# Patient Record
Sex: FEMALE | Employment: UNEMPLOYED | URBAN - METROPOLITAN AREA
[De-identification: names, ages, dates, MRNs, and addresses within clinical notes are randomized per-mention and may not be internally consistent; named-entity substitution may affect disease eponyms.]

---

## 2023-10-25 ENCOUNTER — OFFICE VISIT (OUTPATIENT)
Dept: ORTHOPEDIC SURGERY | Age: 53
End: 2023-10-25
Payer: COMMERCIAL

## 2023-10-25 ENCOUNTER — TELEPHONE (OUTPATIENT)
Dept: ORTHOPEDIC SURGERY | Age: 53
End: 2023-10-25

## 2023-10-25 VITALS — HEIGHT: 66 IN | BODY MASS INDEX: 22.5 KG/M2 | WEIGHT: 140 LBS

## 2023-10-25 DIAGNOSIS — S82.892A CLOSED FRACTURE DISLOCATION OF LEFT ANKLE, INITIAL ENCOUNTER: Primary | ICD-10-CM

## 2023-10-25 DIAGNOSIS — S93.432A ANKLE SYNDESMOSIS DISRUPTION, LEFT, INITIAL ENCOUNTER: ICD-10-CM

## 2023-10-25 PROCEDURE — 99204 OFFICE O/P NEW MOD 45 MIN: CPT | Performed by: ORTHOPAEDIC SURGERY

## 2023-10-25 RX ORDER — HYDROCODONE BITARTRATE AND ACETAMINOPHEN 5; 325 MG/1; MG/1
1 TABLET ORAL EVERY 6 HOURS PRN
Qty: 20 TABLET | Refills: 0 | Status: SHIPPED | OUTPATIENT
Start: 2023-10-25 | End: 2023-10-30

## 2023-10-25 RX ORDER — SPIRONOLACTONE 25 MG/1
25 TABLET ORAL DAILY
COMMUNITY

## 2023-10-25 RX ORDER — CEPHALEXIN 500 MG/1
500 CAPSULE ORAL 4 TIMES DAILY
Qty: 20 CAPSULE | Refills: 0 | Status: SHIPPED | OUTPATIENT
Start: 2023-10-25 | End: 2023-10-30

## 2023-10-25 RX ORDER — FUROSEMIDE 40 MG/1
40 TABLET ORAL DAILY
COMMUNITY

## 2023-10-25 RX ORDER — PAROXETINE HYDROCHLORIDE 20 MG/1
30 TABLET, FILM COATED ORAL EVERY MORNING
COMMUNITY

## 2023-10-25 RX ORDER — METOPROLOL TARTRATE 50 MG/1
50 TABLET, FILM COATED ORAL DAILY
COMMUNITY

## 2023-10-25 RX ORDER — MINOCYCLINE HYDROCHLORIDE 50 MG/1
50 CAPSULE ORAL 2 TIMES DAILY
COMMUNITY

## 2023-10-25 RX ORDER — PREGABALIN 75 MG/1
75 CAPSULE ORAL 2 TIMES DAILY
COMMUNITY

## 2023-10-25 NOTE — PROGRESS NOTES
Name_______________________________________Printed:____________________  Date and time of surgery_______10/26/23 0845_________________[place] Time:____[address] [place]   1. The instructions given regarding when and if a patient needs to stop oral intake prior to surgery varies. Follow the specific instructions you were given                  _x__Nothing to eat or to drink after Midnight the night before.                   ____Carbo loading or instructions will be given to select patients-if you have been given those instructions -please do the following                           The evening before your surgery after dinner before midnight drink 40 ounces of gatorade. If you are diabetic use sugar free. The morning of surgery drink 40 ounces of water. This needs to be finished 3 hours prior to your surgery start time. 2. Take the following pills with a small sip of water on the morning of surgery_________metoprolol__________________________________________                  Do not take blood pressure medications ending in pril or sartan the kraig prior to surgery or the morning of surgery. Dr Bin Solomon patient are not to take any medications the AM of surgery. 3. Aspirin, Ibuprofen, Advil, Naproxen, Vitamin E and other Anti-inflammatory products and supplements should be stopped for 5 -7days before surgery or as directed by your physician. 4. Check with your Doctor regarding stopping Plavix, Coumadin,Eliquis, Lovenox,Effient,Pradaxa,Xarelto, Fragmin or other blood thinners and follow their instructions. 5. Do not smoke, and do not drink any alcoholic beverages 24 hours prior to surgery. This includes NA Beer. Refrain from the usage of any recreational drugs. 6. You may brush your teeth and gargle the morning of surgery. DO NOT SWALLOW WATER   7. You MUST make arrangements for a responsible adult to stay on site while you are here and take you home after your surgery.  You will not be allowed to leave alone or drive yourself home. It is strongly suggested someone stay with you the first 24 hrs. Your surgery will be cancelled if you do not have a ride home. 8. A parent/legal guardian must accompany a child scheduled for surgery and plan to stay at the hospital until the child is discharged. Please do not bring other children with you. 9. Please wear simple, loose fitting clothing to the hospital.  Vincent Choi not bring valuables (money, credit cards, checkbooks, etc.) Do not wear any makeup (including no eye makeup) or nail polish on your fingers or toes. 10. DO NOT wear any jewelry or piercings on day of surgery. All body piercing jewelry must be removed. 11. If you have ___dentures, they will be removed before going to the OR; we will provide you a container. If you wear ___contact lenses or ___glasses, they will be removed; please bring a case for them. 12. Please see your family doctor/pediatrician for a history & physical and/or concerning medications. Bring any test results/reports from your physician's office. PCP________x__________Phone___________H&P Appt. Date________             13 If you  have a Living Will and Durable Power of  for Healthcare, please bring in a copy. 15. Notify your Surgeon if you develop any illness between now and surgery  time, cough, cold, fever, sore throat, nausea, vomiting, etc.  Please notify your surgeon if you experience dizziness, shortness of breath or blurred vision between now & the time of your surgery             15. DO NOT shave your operative site 96 hours prior to surgery. For face & neck surgery, men may use an electric razor 48 hours prior to surgery. 16. Shower the night before or morning of surgery using an antibacterial soap or as you have been instructed. 17. To provide excellent care visitors will be limited to one in the room at any given time.              18.  Please bring

## 2023-10-25 NOTE — TELEPHONE ENCOUNTER
Auth: NPR  Date: 10/26/23 thru 01/25/24  Reference # [id number]  Spoke with: Online  Type of SX: Outpatient  Location: [facility]  CPT: 93548, 24709   DX: S82.899A, C50.122E  SX area: Lt ankle  Insurance: [facility] [employer]

## 2023-10-26 ENCOUNTER — HOSPITAL ENCOUNTER (OUTPATIENT)
Age: 53
Setting detail: OUTPATIENT SURGERY
Discharge: HOME | End: 2023-10-26
Attending: ORTHOPAEDIC SURGERY | Admitting: ORTHOPAEDIC SURGERY
Payer: COMMERCIAL

## 2023-10-26 ENCOUNTER — ANESTHESIA (OUTPATIENT)
Dept: OPERATING ROOM | Age: 53
End: 2023-10-26
Payer: COMMERCIAL

## 2023-10-26 ENCOUNTER — ANESTHESIA EVENT (OUTPATIENT)
Dept: OPERATING ROOM | Age: 53
End: 2023-10-26
Payer: COMMERCIAL

## 2023-10-26 ENCOUNTER — APPOINTMENT (OUTPATIENT)
Dept: GENERAL RADIOLOGY | Age: 53
End: 2023-10-26
Attending: ORTHOPAEDIC SURGERY
Payer: COMMERCIAL

## 2023-10-26 VITALS
TEMPERATURE: 96.9 F | BODY MASS INDEX: 22.82 KG/M2 | OXYGEN SATURATION: 98 % | HEART RATE: 72 BPM | SYSTOLIC BLOOD PRESSURE: 125 MMHG | WEIGHT: 142 LBS | DIASTOLIC BLOOD PRESSURE: 68 MMHG | RESPIRATION RATE: 16 BRPM | HEIGHT: 66 IN

## 2023-10-26 LAB
ANION GAP SERPL CALCULATED.3IONS-SCNC: 16 MMOL/L (ref 3–16)
BUN SERPL-MCNC: 14 MG/DL (ref 7–20)
CALCIUM SERPL-MCNC: 9.3 MG/DL (ref 8.3–10.6)
CHLORIDE SERPL-SCNC: 98 MMOL/L (ref 99–110)
CO2 SERPL-SCNC: 23 MMOL/L (ref 21–32)
CREAT SERPL-MCNC: 0.7 MG/DL (ref 0.6–1.1)
GFR SERPLBLD CREATININE-BSD FMLA CKD-EPI: >60 ML/MIN/{1.73_M2}
GLUCOSE BLD-MCNC: 102 MG/DL (ref 70–99)
GLUCOSE SERPL-MCNC: 106 MG/DL (ref 70–99)
HCG UR QL: NEGATIVE
PERFORMED ON: ABNORMAL
POTASSIUM SERPL-SCNC: 4.3 MMOL/L (ref 3.5–5.1)
SODIUM SERPL-SCNC: 137 MMOL/L (ref 136–145)

## 2023-10-26 PROCEDURE — 6360000002 HC RX W HCPCS: Performed by: ORTHOPAEDIC SURGERY

## 2023-10-26 PROCEDURE — 27848 TREAT ANKLE DISLOCATION: CPT | Performed by: ORTHOPAEDIC SURGERY

## 2023-10-26 PROCEDURE — 3600000004 HC SURGERY LEVEL 4 BASE: Performed by: ORTHOPAEDIC SURGERY

## 2023-10-26 PROCEDURE — 7100000001 HC PACU RECOVERY - ADDTL 15 MIN: Performed by: ORTHOPAEDIC SURGERY

## 2023-10-26 PROCEDURE — 3700000000 HC ANESTHESIA ATTENDED CARE: Performed by: ORTHOPAEDIC SURGERY

## 2023-10-26 PROCEDURE — 80048 BASIC METABOLIC PNL TOTAL CA: CPT

## 2023-10-26 PROCEDURE — 27829 TREAT LOWER LEG JOINT: CPT | Performed by: ORTHOPAEDIC SURGERY

## 2023-10-26 PROCEDURE — 36415 COLL VENOUS BLD VENIPUNCTURE: CPT

## 2023-10-26 PROCEDURE — 7100000000 HC PACU RECOVERY - FIRST 15 MIN: Performed by: ORTHOPAEDIC SURGERY

## 2023-10-26 PROCEDURE — 2709999900 HC NON-CHARGEABLE SUPPLY: Performed by: ORTHOPAEDIC SURGERY

## 2023-10-26 PROCEDURE — 6360000002 HC RX W HCPCS: Performed by: NURSE ANESTHETIST, CERTIFIED REGISTERED

## 2023-10-26 PROCEDURE — 27829 TREAT LOWER LEG JOINT: CPT | Performed by: NURSE PRACTITIONER

## 2023-10-26 PROCEDURE — C1713 ANCHOR/SCREW BN/BN,TIS/BN: HCPCS | Performed by: ORTHOPAEDIC SURGERY

## 2023-10-26 PROCEDURE — 84703 CHORIONIC GONADOTROPIN ASSAY: CPT

## 2023-10-26 PROCEDURE — 2580000003 HC RX 258: Performed by: ANESTHESIOLOGY

## 2023-10-26 PROCEDURE — 27848 TREAT ANKLE DISLOCATION: CPT | Performed by: NURSE PRACTITIONER

## 2023-10-26 PROCEDURE — 3700000001 HC ADD 15 MINUTES (ANESTHESIA): Performed by: ORTHOPAEDIC SURGERY

## 2023-10-26 PROCEDURE — 2500000003 HC RX 250 WO HCPCS: Performed by: NURSE ANESTHETIST, CERTIFIED REGISTERED

## 2023-10-26 PROCEDURE — 3600000014 HC SURGERY LEVEL 4 ADDTL 15MIN: Performed by: ORTHOPAEDIC SURGERY

## 2023-10-26 PROCEDURE — 6360000002 HC RX W HCPCS: Performed by: ANESTHESIOLOGY

## 2023-10-26 PROCEDURE — 7100000011 HC PHASE II RECOVERY - ADDTL 15 MIN: Performed by: ORTHOPAEDIC SURGERY

## 2023-10-26 PROCEDURE — 2720000010 HC SURG SUPPLY STERILE: Performed by: ORTHOPAEDIC SURGERY

## 2023-10-26 PROCEDURE — 7100000010 HC PHASE II RECOVERY - FIRST 15 MIN: Performed by: ORTHOPAEDIC SURGERY

## 2023-10-26 PROCEDURE — 73600 X-RAY EXAM OF ANKLE: CPT

## 2023-10-26 PROCEDURE — 2580000003 HC RX 258: Performed by: ORTHOPAEDIC SURGERY

## 2023-10-26 PROCEDURE — A4217 STERILE WATER/SALINE, 500 ML: HCPCS | Performed by: ORTHOPAEDIC SURGERY

## 2023-10-26 PROCEDURE — 6370000000 HC RX 637 (ALT 250 FOR IP): Performed by: ANESTHESIOLOGY

## 2023-10-26 DEVICE — SCREW BNE L16MM DIA2.7MM HEX HD DIA2.5MM CANC BIODUR 108C: Type: IMPLANTABLE DEVICE | Site: ANKLE | Status: FUNCTIONAL

## 2023-10-26 DEVICE — SCREW BNE L18MM DIA2.7MM HEX HD DIA2.5MM CANC BIODUR 108C: Type: IMPLANTABLE DEVICE | Site: ANKLE | Status: FUNCTIONAL

## 2023-10-26 DEVICE — SCREW BNE L20MM DIA2.7MM SM HEX HD DIA2.5MM CORT S STL ST: Type: IMPLANTABLE DEVICE | Site: ANKLE | Status: FUNCTIONAL

## 2023-10-26 DEVICE — SCREW BNE L14MM DIA2.7MM HEX HD DIA2.5MM CANC BIODUR 108C: Type: IMPLANTABLE DEVICE | Site: ANKLE | Status: FUNCTIONAL

## 2023-10-26 DEVICE — SCREW BNE L14MM DIA3.5MM HD DIA2.7MM CORT PERIARTC S STL ST: Type: IMPLANTABLE DEVICE | Site: ANKLE | Status: FUNCTIONAL

## 2023-10-26 DEVICE — SCREW BNE L18MM DIA2.7MM CORT ANK FT S STL ST CANN: Type: IMPLANTABLE DEVICE | Site: ANKLE | Status: FUNCTIONAL

## 2023-10-26 DEVICE — PLATE BNE L80MM 4 H L LAT DST PERIARTC FIBULAR S STL LOK: Type: IMPLANTABLE DEVICE | Site: ANKLE | Status: FUNCTIONAL

## 2023-10-26 DEVICE — SCREW BNE L44MM DIA3.5MM CORT S STL ST NONCANNULATED IM: Type: IMPLANTABLE DEVICE | Site: ANKLE | Status: FUNCTIONAL

## 2023-10-26 DEVICE — SCREW BNE L48MM DIA3.5MM CORT PERIARTC S STL ST: Type: IMPLANTABLE DEVICE | Site: ANKLE | Status: FUNCTIONAL

## 2023-10-26 RX ORDER — FENTANYL CITRATE 50 UG/ML
INJECTION, SOLUTION INTRAMUSCULAR; INTRAVENOUS PRN
Status: DISCONTINUED | OUTPATIENT
Start: 2023-10-26 | End: 2023-10-26 | Stop reason: SDUPTHER

## 2023-10-26 RX ORDER — ONDANSETRON 2 MG/ML
4 INJECTION INTRAMUSCULAR; INTRAVENOUS
Status: DISCONTINUED | OUTPATIENT
Start: 2023-10-26 | End: 2023-10-26 | Stop reason: HOSPADM

## 2023-10-26 RX ORDER — HYDROMORPHONE HYDROCHLORIDE 2 MG/ML
0.5 INJECTION, SOLUTION INTRAMUSCULAR; INTRAVENOUS; SUBCUTANEOUS EVERY 5 MIN PRN
Status: DISCONTINUED | OUTPATIENT
Start: 2023-10-26 | End: 2023-10-26 | Stop reason: HOSPADM

## 2023-10-26 RX ORDER — DEXAMETHASONE SODIUM PHOSPHATE 4 MG/ML
INJECTION, SOLUTION INTRA-ARTICULAR; INTRALESIONAL; INTRAMUSCULAR; INTRAVENOUS; SOFT TISSUE PRN
Status: DISCONTINUED | OUTPATIENT
Start: 2023-10-26 | End: 2023-10-26 | Stop reason: SDUPTHER

## 2023-10-26 RX ORDER — PHENYLEPHRINE HCL IN 0.9% NACL 1 MG/10 ML
SYRINGE (ML) INTRAVENOUS PRN
Status: DISCONTINUED | OUTPATIENT
Start: 2023-10-26 | End: 2023-10-26 | Stop reason: SDUPTHER

## 2023-10-26 RX ORDER — BUPIVACAINE HYDROCHLORIDE 5 MG/ML
INJECTION, SOLUTION EPIDURAL; INTRACAUDAL
Status: COMPLETED | OUTPATIENT
Start: 2023-10-26 | End: 2023-10-26

## 2023-10-26 RX ORDER — LIDOCAINE HYDROCHLORIDE 20 MG/ML
INJECTION, SOLUTION INFILTRATION; PERINEURAL PRN
Status: DISCONTINUED | OUTPATIENT
Start: 2023-10-26 | End: 2023-10-26 | Stop reason: SDUPTHER

## 2023-10-26 RX ORDER — MIDAZOLAM HYDROCHLORIDE 1 MG/ML
INJECTION INTRAMUSCULAR; INTRAVENOUS PRN
Status: DISCONTINUED | OUTPATIENT
Start: 2023-10-26 | End: 2023-10-26 | Stop reason: SDUPTHER

## 2023-10-26 RX ORDER — SODIUM CHLORIDE 9 MG/ML
INJECTION, SOLUTION INTRAVENOUS PRN
Status: DISCONTINUED | OUTPATIENT
Start: 2023-10-26 | End: 2023-10-26 | Stop reason: HOSPADM

## 2023-10-26 RX ORDER — SODIUM CHLORIDE 0.9 % (FLUSH) 0.9 %
5-40 SYRINGE (ML) INJECTION EVERY 12 HOURS SCHEDULED
Status: DISCONTINUED | OUTPATIENT
Start: 2023-10-26 | End: 2023-10-26 | Stop reason: HOSPADM

## 2023-10-26 RX ORDER — PROPOFOL 10 MG/ML
INJECTION, EMULSION INTRAVENOUS PRN
Status: DISCONTINUED | OUTPATIENT
Start: 2023-10-26 | End: 2023-10-26 | Stop reason: SDUPTHER

## 2023-10-26 RX ORDER — MEPERIDINE HYDROCHLORIDE 25 MG/ML
12.5 INJECTION INTRAMUSCULAR; INTRAVENOUS; SUBCUTANEOUS EVERY 5 MIN PRN
Status: DISCONTINUED | OUTPATIENT
Start: 2023-10-26 | End: 2023-10-26 | Stop reason: HOSPADM

## 2023-10-26 RX ORDER — SODIUM CHLORIDE 9 MG/ML
INJECTION, SOLUTION INTRAVENOUS CONTINUOUS
Status: DISCONTINUED | OUTPATIENT
Start: 2023-10-26 | End: 2023-10-26 | Stop reason: HOSPADM

## 2023-10-26 RX ORDER — ONDANSETRON 2 MG/ML
INJECTION INTRAMUSCULAR; INTRAVENOUS PRN
Status: DISCONTINUED | OUTPATIENT
Start: 2023-10-26 | End: 2023-10-26 | Stop reason: SDUPTHER

## 2023-10-26 RX ORDER — SODIUM CHLORIDE 0.9 % (FLUSH) 0.9 %
5-40 SYRINGE (ML) INJECTION PRN
Status: DISCONTINUED | OUTPATIENT
Start: 2023-10-26 | End: 2023-10-26 | Stop reason: HOSPADM

## 2023-10-26 RX ORDER — HYDROCODONE BITARTRATE AND ACETAMINOPHEN 5; 325 MG/1; MG/1
1 TABLET ORAL
Status: COMPLETED | OUTPATIENT
Start: 2023-10-26 | End: 2023-10-26

## 2023-10-26 RX ADMIN — MIDAZOLAM 2 MG: 1 INJECTION INTRAMUSCULAR; INTRAVENOUS at 08:05

## 2023-10-26 RX ADMIN — PROPOFOL 150 MG: 10 INJECTION, EMULSION INTRAVENOUS at 08:10

## 2023-10-26 RX ADMIN — HYDROMORPHONE HYDROCHLORIDE 0.5 MG: 2 INJECTION, SOLUTION INTRAMUSCULAR; INTRAVENOUS; SUBCUTANEOUS at 09:29

## 2023-10-26 RX ADMIN — CEFAZOLIN 2000 MG: 2 INJECTION, POWDER, FOR SOLUTION INTRAMUSCULAR; INTRAVENOUS at 08:04

## 2023-10-26 RX ADMIN — Medication 150 MCG: at 08:55

## 2023-10-26 RX ADMIN — FENTANYL CITRATE 50 MCG: 50 INJECTION, SOLUTION INTRAMUSCULAR; INTRAVENOUS at 08:49

## 2023-10-26 RX ADMIN — DEXAMETHASONE SODIUM PHOSPHATE 4 MG: 4 INJECTION, SOLUTION INTRAMUSCULAR; INTRAVENOUS at 08:18

## 2023-10-26 RX ADMIN — Medication 100 MCG: at 08:17

## 2023-10-26 RX ADMIN — MIDAZOLAM 2 MG: 1 INJECTION INTRAMUSCULAR; INTRAVENOUS at 08:07

## 2023-10-26 RX ADMIN — SODIUM CHLORIDE: 9 INJECTION, SOLUTION INTRAVENOUS at 07:45

## 2023-10-26 RX ADMIN — Medication 100 MCG: at 08:19

## 2023-10-26 RX ADMIN — Medication 100 MCG: at 08:57

## 2023-10-26 RX ADMIN — Medication 150 MCG: at 08:45

## 2023-10-26 RX ADMIN — HYDROCODONE BITARTRATE AND ACETAMINOPHEN 1 TABLET: 5; 325 TABLET ORAL at 09:52

## 2023-10-26 RX ADMIN — Medication 100 MCG: at 08:30

## 2023-10-26 RX ADMIN — FENTANYL CITRATE 50 MCG: 50 INJECTION, SOLUTION INTRAMUSCULAR; INTRAVENOUS at 08:22

## 2023-10-26 RX ADMIN — LIDOCAINE HYDROCHLORIDE 100 MG: 20 INJECTION, SOLUTION INFILTRATION; PERINEURAL at 08:10

## 2023-10-26 RX ADMIN — ONDANSETRON 4 MG: 2 INJECTION INTRAMUSCULAR; INTRAVENOUS at 08:18

## 2023-10-26 RX ADMIN — Medication 100 MCG: at 08:23

## 2023-10-26 RX ADMIN — HYDROMORPHONE HYDROCHLORIDE 0.5 MG: 2 INJECTION, SOLUTION INTRAMUSCULAR; INTRAVENOUS; SUBCUTANEOUS at 09:35

## 2023-10-26 RX ADMIN — Medication 100 MCG: at 08:36

## 2023-10-26 ASSESSMENT — PAIN DESCRIPTION - LOCATION
LOCATION: ANKLE

## 2023-10-26 ASSESSMENT — PAIN SCALES - GENERAL
PAINLEVEL_OUTOF10: 8
PAINLEVEL_OUTOF10: 4
PAINLEVEL_OUTOF10: 8

## 2023-10-26 ASSESSMENT — PAIN DESCRIPTION - ORIENTATION
ORIENTATION: LEFT

## 2023-10-26 ASSESSMENT — PAIN DESCRIPTION - DESCRIPTORS
DESCRIPTORS: SORE
DESCRIPTORS: SORE
DESCRIPTORS: ACHING
DESCRIPTORS: DULL

## 2023-10-26 ASSESSMENT — PAIN - FUNCTIONAL ASSESSMENT
PAIN_FUNCTIONAL_ASSESSMENT: PREVENTS OR INTERFERES SOME ACTIVE ACTIVITIES AND ADLS
PAIN_FUNCTIONAL_ASSESSMENT: 0-10

## 2023-10-26 NOTE — PROGRESS NOTES
Teaching/ education completed for home care including pain management, wound care,activity,safety precautions and infection control. Patient and family verbalized understanding.

## 2023-10-26 NOTE — DISCHARGE INSTRUCTIONS
Post op instruction:  1- D/C home  2- Dx Left ankle pain / ankle fracture dislocation with syndesmosis disruption. 3- NWB left ankle  4- Elevation surgical site, with ice  5- Keep splint dry and clean  6- F/U in 2 weeks. 7- For DVT prophylaxis- Aspirin 325 mg daily (Do not take since taking Eliquis)    Qamar Allison MD, 10/26/2023        ORTHOPEDIC/PODIATRY DISCHARGE INSTRUCTIONS    Follow your surgeons instructions. Make follow-up appointment. Robin Redd op 9:30 AM   Dr. Qamar Allison MD  [address] [facility]   [address]   [phone]     Observe operative area for signs of excessive bleeding such as a slow general ooze that saturates the dressing or bright red bleeding. In either case, apply pressure to the area and elevate if possible and call your surgeon right away. Observe the affected extremity for circulation or nerve impairment such as a change in color, numbness, tingling, coldness or increased pain. If any of these symptoms are present call your surgeon. Observe operative site for any signs of infection such as increased pain, redness, fever greater than 101 degrees, swelling, foul odor or drainage. Contact surgeon if any of these symptoms are present. If you become short of breath call your surgeon or go to the nearest emergency room. Elevate extremity as directed by surgeon. Use ice pack as directed by surgeon. Do not use heat. Avoid stress to suture line such as pulling, pushing or tugging. Use crutches as directed by surgeon NON WEIGHT BEARING LEFT FOOT/ANKLE  Take medications as ordered. Take pain medication with food. Do not drive or operate machinery while taking narcotics. Call your surgeon for any questions or problems. DR Josephine Fabian [phone]    ANESTHESIA DISCHARGE INSTRUCTIONS    Wear your seatbelt home.   You are under the influence of drugs-do not drink alcohol, drive, operate machinery, make any

## 2023-10-26 NOTE — ANESTHESIA PRE PROCEDURE
Department of Anesthesiology  Preprocedure Note       Name:  Soila Gonzalez   Age:  46 y.o.  :  1970                                          MRN:  [id number]         Date:  10/26/2023      Surgeon: Yu Lopez):  Kennedi Elizabeth MD    Procedure: Procedure(s):  LEFT ANKLE OPEN REDUCTION INTERNAL FIXATION FRACTURE DISLOCATION WITH SYNDESMOSIS REPAIR    Medications prior to admission:   Prior to Admission medications    Medication Sig Start Date End Date Taking? Authorizing Provider   Apixaban (ELIQUIS PO) Take 5 mg by mouth in the morning and 5 mg in the evening. Yes Albert Samson MD   Sacubitril-Valsartan (ENTRESTO PO) 26 mg 2 times daily   Yes Albert Samson MD   spironolactone (ALDACTONE) 25 MG tablet Take 1 tablet by mouth daily   Yes Albert Samson MD   pregabalin (LYRICA) 75 MG capsule Take 1 capsule by mouth 2 times daily. Max Daily Amount: 150 mg   Yes Albert Samson MD   furosemide (LASIX) 40 MG tablet Take 1 tablet by mouth daily   Yes Albert Samson MD   metoprolol tartrate (LOPRESSOR) 50 MG tablet Take 1 tablet by mouth daily   Yes ProviderAlbert MD   dapagliflozin (FARXIGA) 10 MG tablet Take 1 tablet by mouth every morning   Yes ProviderAlbert MD   PARoxetine (PAXIL) 20 MG tablet Take 1.5 tablets by mouth every morning   Yes ProviderAlbert MD   minocycline (MINOCIN;DYNACIN) 50 MG capsule Take 1 capsule by mouth 2 times daily   Yes Albert Samson MD   HYDROcodone-acetaminophen (NORCO) 5-325 MG per tablet Take 1 tablet by mouth every 6 hours as needed for Pain for up to 5 days. Max Daily Amount: 4 tablets 10/25/23 10/30/23  Kennedi Elizabeth MD   cephALEXin (KEFLEX) 500 MG capsule Take 1 capsule by mouth 4 times daily for 5 days 10/25/23 10/30/23  Kennedi Elizabeth MD       Current medications:    No current facility-administered medications for this encounter.        Allergies:  No Known Allergies    Problem List:    Patient Active

## 2023-10-26 NOTE — H&P
Preoperative H&P Update    The patient's History and Physical in the medical record from 10/25/2023 was reviewed by me today. Past Medical History:   Diagnosis Date    Cardiomyopathy ([address])     CHF (congestive heart failure) ([address])     Hx of blood clots      Past Surgical History:   Procedure Laterality Date    BACK SURGERY       No current facility-administered medications on file prior to encounter. Current Outpatient Medications on File Prior to Encounter   Medication Sig Dispense Refill    Apixaban (ELIQUIS PO) Take 5 mg by mouth in the morning and 5 mg in the evening. Sacubitril-Valsartan (ENTRESTO PO) 26 mg 2 times daily      spironolactone (ALDACTONE) 25 MG tablet Take 1 tablet by mouth daily      pregabalin (LYRICA) 75 MG capsule Take 1 capsule by mouth 2 times daily. Max Daily Amount: 150 mg      furosemide (LASIX) 40 MG tablet Take 1 tablet by mouth daily      metoprolol tartrate (LOPRESSOR) 50 MG tablet Take 1 tablet by mouth daily      dapagliflozin (FARXIGA) 10 MG tablet Take 1 tablet by mouth every morning      PARoxetine (PAXIL) 20 MG tablet Take 1.5 tablets by mouth every morning      minocycline (MINOCIN;DYNACIN) 50 MG capsule Take 1 capsule by mouth 2 times daily      HYDROcodone-acetaminophen (NORCO) 5-325 MG per tablet Take 1 tablet by mouth every 6 hours as needed for Pain for up to 5 days. Max Daily Amount: 4 tablets 20 tablet 0    cephALEXin (KEFLEX) 500 MG capsule Take 1 capsule by mouth 4 times daily for 5 days 20 capsule 0       No Known Allergies   I reviewed the HPI, medications, allergies, reason for surgery, diagnosis and treatment plan and there has been no change. The patient was evaluated by me today. Physical exam findings for this update include: There were no vitals filed for this visit.   Airway is intact  Chest: chest clear, no wheezing, rales, normal symmetric air entry, no tachypnea, retractions or cyanosis  Heart: regular rate and rhythm ; heart sounds

## 2023-10-26 NOTE — PROGRESS NOTES
Discharge instructions reviewed with patient/responsible adult. All home medications have been reviewed, questions answered and patient verbalized understanding. Discharge instructions signed and copies given. Patient discharged  per w/cwmihai belongings.

## 2023-10-26 NOTE — PROGRESS NOTES
Received from OR -Drowsy,simple mask @ 6 liters 100%,left ankle dressing/ace/splint dry and intact,circulation good, elevated ,ice pack placed, scd,vss.

## 2023-10-26 NOTE — ANESTHESIA POSTPROCEDURE EVALUATION
Department of Anesthesiology  Postprocedure Note    Patient: Sandie Hernandez  MRN: [id number]  YOB: 1970  Date of evaluation: 10/26/2023      Procedure Summary     Date: 10/26/23 Room / Location: [address]    Anesthesia Start: 9526 Anesthesia Stop: 2970    Procedure: LEFT ANKLE OPEN REDUCTION INTERNAL FIXATION FRACTURE DISLOCATION WITH SYNDESMOSIS REPAIR (Left: Ankle) Diagnosis:       Closed fracture of ankle, unspecified laterality, initial encounter      Syndesmotic disruption of ankle, left, initial encounter      (Closed fracture of ankle, unspecified laterality, initial encounter [S82.899A])      (Syndesmotic disruption of ankle, left, initial encounter [P63.178L])    Surgeons: Guillermo Jacques MD Responsible Provider: Celina Tilley MD    Anesthesia Type: General ASA Status: 3          Anesthesia Type: General    Db Phase I: Db Score: 8    Db Phase II:        Anesthesia Post Evaluation    Patient location during evaluation: PACU  Patient participation: complete - patient participated  Level of consciousness: awake  Airway patency: patent  Nausea & Vomiting: no vomiting and no nausea  Complications: no  Cardiovascular status: hemodynamically stable  Respiratory status: acceptable  Hydration status: stable  Multimodal analgesia pain management approach  Pain management: adequate

## 2023-10-27 NOTE — BRIEF OP NOTE
Brief Postoperative Note      Patient: Tripp Schaffer  YOB: 1970  MRN: [id number]    Date of Procedure: 10/26/2023    Pre-Op Diagnosis Codes:     * Closed fracture of ankle, unspecified laterality, initial encounter [S82.899A]     * Syndesmotic disruption of ankle, left, initial encounter [S93.432A]    Post-Op Diagnosis: Same       Procedure(s):  LEFT ANKLE OPEN REDUCTION INTERNAL FIXATION FRACTURE DISLOCATION WITH SYNDESMOSIS REPAIR    Surgeon(s):  Carlos Rosales MD    Assistant: Drea Retana CNP    Anesthesia: General    Estimated Blood Loss (mL): Minimal    Complications: None    Specimens:   * No specimens in log *    Implants:  Implant Name Type Inv. Item Serial No.  Lot No. LRB No. Used Action   PLATE BNE [id number] 4 H L LAT DST PERIARTC FIBULAR S STL [place]ORE - [id number]  PLATE BNE [id number] 4 H L LAT DST PERIARTC FIBULAR S STL THEODORE  [employer]WD  Left 1 Implanted   SCREW BNE L14MM DIA3. 5MM HD DIA2.7MM INDIGO PERIARTC S STL ST - [id number]  SCREW BNE L14MM DIA3. 5MM HD DIA2.7MM INDIGO PERIARTC S STL ST  [employer]WD  Left 2 Implanted   SCREW BNE L44MM DIA3.5MM INDIGO S STL ST NONCANNULATED IM - [id number]  SCREW BNE L44MM DIA3.5MM INDIGO S STL ST NONCANNULATED IM  [employer]WD  Left 1 Implanted   SCREW BNE L48MM DIA3.5MM INDIGO PERIARTC S STL ST - [id number]  SCREW BNE L48MM DIA3.5MM INDIGO PERIARTC S STL ST  [employer]IN[employer]WD  Left 1 Implanted   SCREW BNE L18MM DIA2.7MM INDIGO ANK FT S STL ST ROCÍO - [id number]  SCREW BNE L18MM DIA2.7MM INDIGO ANK FT S STL ST ROCÍO  [employer]IN[employer]WD  Left 1 Implanted   SCREW BNE L20MM DIA2.7MM SM HEX HD DIA2.5MM INDIGO S STL ST - [id number]  SCREW BNE L20MM DIA2.7MM SM HEX HD DIA2.5MM INDIGO S STL ST  [employer]-WD  Left 1 Implanted   SCREW BNE L18MM DIA2.7MM HEX HD DIA2.5MM CANC BIODUR 108C - [id number]  SCREW BNE L18MM DIA2.7MM HEX HD DIA2.5MM CANC BIODUR 108C  [employer]INASH [employer]ET [employer]-WD  Left 2 Implanted   SCREW BNE L16MM DIA2.7MM

## 2023-10-27 NOTE — OP NOTE
[address] [facility]                     [address], [address]                                OPERATIVE REPORT    PATIENT NAME: Jenifer Orlando                    :        1970  MED REC NO:   [id number]                          ROOM:  ACCOUNT NO:   [de-identified]                           ADMIT DATE: 10/26/2023  PROVIDER:     Loreto Rebolledo MD    DATE OF PROCEDURE:  10/26/2023    PREOPERATIVE DIAGNOSES:  1. Left ankle comminuted unstable ankle fracture dislocation. 2.  Left ankle distal tibiofibular syndesmosis disruption. POSTOPERATIVE DIAGNOSES:  1. Left ankle comminuted unstable ankle fracture dislocation. 2.  Left ankle distal tibiofibular syndesmosis disruption. OPERATION PERFORMED:  1. Open treatment of left ankle unstable fracture dislocation with open  reduction and internal fixation. 2.  Open treatment of left ankle distal tibiofibular syndesmosis  disruption with two syndesmosis screws. SURGEON:  Loreto Rebolledo MD    ASSISTANT:  Kiara Anne CNP    ANESTHESIA:  General anesthesia. ESTIMATED BLOOD LOSS:  Minimal.    COMPLICATIONS:  None. TOURNIQUET:  Left upper thigh, 350 mmHg. IMPLANT USED:  1. Alida distal fibula locking plate and two lag screws. 2.  Alida 3.5 cortical screw x2 for syndesmosis repair. INDICATIONS:  This is a 67-year-old white female, still fairly active,  who was in a festival out of town and sustained a twisting injury to her  left ankle. She sustained the left ankle fracture dislocation. She was  seen in outside facility where she was x-rayed and reduced and splinted. She was then seen at the [facility] and came to me for second  opinion and we recommended surgical fixation. All risks, benefits, and  alternatives were discussed with the patient and she agreed to proceed  with the surgical repair. DESCRIPTION OF THE PROCEDURE:  The patient's left ankle was marked.   She  received 2 gm Ancef IV was taken to the recovery  in a stable condition. Doug Wolff CNP was 1st Assist given the nature of the procedure that needed advanced assistance. She assisted in all aspect of the procedure, including but limited to draping in a sterile fashion, exposure of surgical area, controlling bleeding, retracting and protecting important structures, achieve and maintain bone reduction and surgical wound closure with dressing application. POSTOPERATIVE PLAN:  The patient will be discharged home. Because of  the instability of the fracture dislocation, she will be  nonweightbearing for six weeks but we can start range of motion in two  weeks.         Serg Zurita MD    D: 10/27/2023 6:52:42       T: 10/27/2023 11:51:08     [email or web address]  Job#: [id number]     Doc#: [id number]    CC:

## 2023-10-30 ENCOUNTER — TELEPHONE (OUTPATIENT)
Dept: ORTHOPEDIC SURGERY | Age: 53
End: 2023-10-30

## 2023-10-30 NOTE — TELEPHONE ENCOUNTER
Received a request from Jeancarlos Castillo to discuss hydrocodone treatment goals.  Patient states she is able to manage her pain with OTC Tylenol

## 2023-11-08 ENCOUNTER — OFFICE VISIT (OUTPATIENT)
Dept: ORTHOPEDIC SURGERY | Age: 53
End: 2023-11-08

## 2023-11-08 VITALS — BODY MASS INDEX: 22.82 KG/M2 | HEIGHT: 66 IN | WEIGHT: 142 LBS

## 2023-11-08 DIAGNOSIS — S93.432A ANKLE SYNDESMOSIS DISRUPTION, LEFT, INITIAL ENCOUNTER: ICD-10-CM

## 2023-11-08 DIAGNOSIS — S82.892A CLOSED FRACTURE DISLOCATION OF LEFT ANKLE, INITIAL ENCOUNTER: Primary | ICD-10-CM

## 2023-11-08 PROCEDURE — APPNB30 APP NON BILLABLE TIME 0-30 MINS: Performed by: NURSE PRACTITIONER

## 2023-11-08 PROCEDURE — 99024 POSTOP FOLLOW-UP VISIT: CPT | Performed by: NURSE PRACTITIONER

## 2023-11-08 NOTE — PROGRESS NOTES
DIAGNOSIS:  Left ankle comminuted unstable ankle fracture dislocation, status post ORIF, with syndesmosis repair x2 screws. DATE OF SURGERY: 10/26/2023. HISTORY OF PRESENT ILLNESS:  Ms. Ha Basurto 46 y.o.  female who came in today for 2 weeks postoperative visit. The patient denies any significant pain in the left ankle. Rates pain a 6/10 VAS moderate, aching, intermittent and are improving. Aggravating factors movement. Alleviating factors elevation and rest. She has been in a splint, and non WB. No numbness or tingling sensation. No fever or Chills. She is on Eliquis for a cardiac condition. PHYSICAL EXAMINATION:  The incision healing well. No signs of any erythema or drainage, minimal swelling. She has no pain with the active or passive range of motion of the left ankle, but decrease ROM. She has intact sensation distally, and she is neurovascularly intact. IMAGING:  Three views left ankle showed anatomic alignment of the fracture, plate and screws and syndesmosis screws x2 in good position, no loosening. Ankle mortise is well centered. IMPRESSION:  2 weeks out from left ankle comminuted unstable ankle fracture dislocation, ORIF with syndesmosis repair x2 screws, and doing very well. PLAN: She placed in a boot, and non WB 6 weeks. I have told the patient to work on ROM, she was instructed  in incisional care. The patient will come back for a follow up in 6 weeks. At that time, we will take 3 views of the left ankle standing. She will need a staged procedure with syndesmosis screw removal 4-5 months postop.             Nguyen Man, APRN - CNP

## 2024-01-31 ENCOUNTER — OFFICE VISIT (OUTPATIENT)
Dept: ORTHOPEDIC SURGERY | Age: 54
End: 2024-01-31
Payer: COMMERCIAL

## 2024-01-31 DIAGNOSIS — S93.432A ANKLE SYNDESMOSIS DISRUPTION, LEFT, INITIAL ENCOUNTER: ICD-10-CM

## 2024-01-31 DIAGNOSIS — S82.892A CLOSED FRACTURE DISLOCATION OF LEFT ANKLE, INITIAL ENCOUNTER: Primary | ICD-10-CM

## 2024-01-31 PROCEDURE — 99213 OFFICE O/P EST LOW 20 MIN: CPT | Performed by: ORTHOPAEDIC SURGERY

## 2024-01-31 NOTE — PROGRESS NOTES
DIAGNOSIS:  Left ankle comminuted unstable ankle fracture dislocation, status post ORIF, with syndesmosis repair x2 screws.    DATE OF SURGERY: 10/26/2023.    HISTORY OF PRESENT ILLNESS:  Dianelys Diaz 53 y.o.  female who came in today for 3 months postoperative visit.  The patient denies any significant pain in the left ankle. Rates pain a 4/10 VAS moderate and doing very well. She has been in regular shoes and WB. She has numbness or tingling sensation in the dorsal foot.  No fever or Chills.  She is on Eliquis for a cardiac condition.    Past Medical History:   Diagnosis Date    Cardiomyopathy (HCC)     CHF (congestive heart failure) (HCC)     Hx of blood clots        Past Surgical History:   Procedure Laterality Date    ANKLE FRACTURE SURGERY Left 10/26/2023    LEFT ANKLE OPEN REDUCTION INTERNAL FIXATION FRACTURE DISLOCATION WITH SYNDESMOSIS REPAIR performed by John Shaw MD at [facility] OR    BACK SURGERY         Social History     Socioeconomic History    Marital status: Single     Spouse name: Not on file    Number of children: Not on file    Years of education: Not on file    Highest education level: Not on file   Occupational History    Not on file   Tobacco Use    Smoking status: Former     Current packs/day: 0.00     Types: Cigarettes     Quit date:      Years since quittin.1    Smokeless tobacco: Never   Substance and Sexual Activity    Alcohol use: Yes     Alcohol/week: 2.0 standard drinks of alcohol     Types: 2 Glasses of wine per week     Comment: social    Drug use: Never    Sexual activity: Not on file   Other Topics Concern    Not on file   Social History Narrative    Not on file     Social Determinants of Health     Financial Resource Strain: Not on file   Food Insecurity: Not on file   Transportation Needs: Not on file   Physical Activity: Not on file   Stress: Not on file   Social Connections: Not on file   Intimate Partner Violence: Not on file   Housing Stability: Not

## 2024-02-28 ENCOUNTER — PREP FOR PROCEDURE (OUTPATIENT)
Dept: ORTHOPEDIC SURGERY | Age: 54
End: 2024-02-28

## 2024-02-28 ENCOUNTER — TELEPHONE (OUTPATIENT)
Dept: ORTHOPEDIC SURGERY | Age: 54
End: 2024-02-28

## 2024-02-28 ENCOUNTER — OFFICE VISIT (OUTPATIENT)
Dept: ORTHOPEDIC SURGERY | Age: 54
End: 2024-02-28
Payer: COMMERCIAL

## 2024-02-28 VITALS — WEIGHT: 141 LBS | HEIGHT: 65 IN | BODY MASS INDEX: 23.49 KG/M2

## 2024-02-28 DIAGNOSIS — S93.432D SYNDESMOTIC DISRUPTION OF LEFT ANKLE, SUBSEQUENT ENCOUNTER: ICD-10-CM

## 2024-02-28 DIAGNOSIS — S82.892A CLOSED FRACTURE DISLOCATION OF LEFT ANKLE, INITIAL ENCOUNTER: Primary | ICD-10-CM

## 2024-02-28 PROBLEM — S93.432A SYNDESMOTIC DISRUPTION OF LEFT ANKLE: Status: ACTIVE | Noted: 2024-02-28

## 2024-02-28 PROCEDURE — 99214 OFFICE O/P EST MOD 30 MIN: CPT | Performed by: ORTHOPAEDIC SURGERY

## 2024-02-28 NOTE — PROGRESS NOTES
[facility] Pre-Admission Testing Electronic Communication Worksheet for OR/ENDO Procedures        Patient: Dianelys Diaz    DOS: 3/4    Arrival Time:0600     Surgery Time:0745    Meds to Bed:  [] YES    [x]  NO    Transportation Confirmed: [x] YES    []  NO    History and Physical:  [x] YES    []  NO  [] N/A  If yes, please list doctor or Urgent Care and date of H&P: per Dr Shaw    Additional Clearance(Cardiac, Pulmonary, etc):  [] YES    []  NO    Pre-Admission Testing Visit:  [] YES    [x]  NO If no, do labs/testing need to be done DOS?  [] YES    []  NO    Medication Reconciliation Complete:  [x] YES    []  NO        Additional Notes: Pt states Dr Shaw \"did not have her stop her Eliquis for surgery like last time \"                Interview Complete: [x] YES    []  NO          Selma Jackson, RN  1:16 PM

## 2024-02-28 NOTE — PROGRESS NOTES
History reviewed. No pertinent family history.    Current Outpatient Medications on File Prior to Visit   Medication Sig Dispense Refill    Apixaban (ELIQUIS PO) Take 5 mg by mouth in the morning and 5 mg in the evening.      Sacubitril-Valsartan (ENTRESTO PO) 26 mg 2 times daily      spironolactone (ALDACTONE) 25 MG tablet Take 1 tablet by mouth daily      pregabalin (LYRICA) 75 MG capsule Take 1 capsule by mouth 2 times daily.      furosemide (LASIX) 40 MG tablet Take 1 tablet by mouth daily      metoprolol tartrate (LOPRESSOR) 50 MG tablet Take 1 tablet by mouth daily      dapagliflozin (FARXIGA) 10 MG tablet Take 1 tablet by mouth every morning      PARoxetine (PAXIL) 20 MG tablet Take 1.5 tablets by mouth every morning      minocycline (MINOCIN;DYNACIN) 50 MG capsule Take 1 capsule by mouth 2 times daily       No current facility-administered medications on file prior to visit.       Pertinent items are noted in HPI  Review of systems reviewed from Patient History Form and available in the patient's chart under the Media tab. No change noted.      PHYSICAL EXAMINATION:  Ms. iDaz is a very pleasant 53 y.o.  female who presents today in no acute distress, awake, alert, and oriented.  She is well dressed, nourished and  groomed.  Patient with normal affect.  Height is  1.651 m (5' 5\"), weight is 64 kg (141 lb), Body mass index is 23.46 kg/m².  Resting respiratory rate is 16.     The patient walks with no limp. The incision healed well.  No signs of any erythema or drainage, minimal swelling. She has no pain with the active or passive range of motion of the left ankle, but mild decrease ROM.  She has intact sensation distally, and she is neurovascularly intact.    IMAGING:  Three views left ankle showed anatomic alignment of the fracture, plate and screws and syndesmosis screws x 2 in good position, no loosening. Ankle mortise is well centered.    IMPRESSION:  3 months out from left ankle

## 2024-02-28 NOTE — PROGRESS NOTES
[facility] PRE-OPERATIVE INSTRUCTIONS    Day of Procedure:    3/4            Arrival time:    0600            Surgery time:0745    Take the following medications with a sip of water:  Follow your MD/Surgeons pre-procedure instructions regarding your medications     Do not eat or drink anything after 12:00 midnight prior to your surgery.  This includes water chewing gum, mints and ice chips.   You may brush your teeth and gargle the morning of your surgery, but do not swallow the water     Please see your family doctor/pediatrician for a history and physical and/or concerning medications.   Bring any test results/reports from your physicians office.   If you are under the care of a heart doctor or specialist doctor, please be aware that you may be asked to them for clearance    You may be asked to stop blood thinners such as Coumadin, Plavix, Fragmin, Lovenox, etc., or any anti-inflammatories such as:  Aspirin, Ibuprofen, Advil, Naproxen prior to your surgery.    We also ask that you stop any OTC medications such as fish oil, vitamin E, glucosamine, garlic, Multivitamins, COQ 10, etc.    We ask that you do not smoke 24 hours prior to surgery  We ask that you do not  drink any alcoholic beverages 24 hours prior to surgery     You must make arrangements for a responsible adult to take you home after your surgery.    For your safety you will not be allowed to leave alone or drive yourself home.  Your surgery will be cancelled if you do not have a ride home.     Also for your safety, it is strongly suggested that someone stay with you the first 24 hours after your surgery.     A parent or legal guardian must accompany a child scheduled for surgery and plan to stay at the hospital until the child is discharged.    Please do not bring other children with you.    For your comfort, please wear simple loose fitting clothing to the hospital.  Please do not bring valuables.    Do not wear any make-up or nail polish

## 2024-02-28 NOTE — TELEPHONE ENCOUNTER
Auth: NPR  Date: 03/04/24 thru 06/03/24  Reference # [id number]  Spoke with: Online  Type of SX: Outpatient  Location: W  CPT: 21359   DX: T84.84XA  SX area: LT ankle  Insurance: Stacy

## 2024-03-01 ENCOUNTER — ANESTHESIA EVENT (OUTPATIENT)
Dept: OPERATING ROOM | Age: 54
End: 2024-03-01
Payer: COMMERCIAL

## 2024-03-04 ENCOUNTER — APPOINTMENT (OUTPATIENT)
Dept: GENERAL RADIOLOGY | Age: 54
End: 2024-03-04
Attending: ORTHOPAEDIC SURGERY
Payer: COMMERCIAL

## 2024-03-04 ENCOUNTER — HOSPITAL ENCOUNTER (OUTPATIENT)
Age: 54
Setting detail: OUTPATIENT SURGERY
Discharge: HOME | End: 2024-03-04
Attending: ORTHOPAEDIC SURGERY | Admitting: ORTHOPAEDIC SURGERY
Payer: COMMERCIAL

## 2024-03-04 ENCOUNTER — ANESTHESIA (OUTPATIENT)
Dept: OPERATING ROOM | Age: 54
End: 2024-03-04
Payer: COMMERCIAL

## 2024-03-04 VITALS
SYSTOLIC BLOOD PRESSURE: 95 MMHG | WEIGHT: 140.87 LBS | OXYGEN SATURATION: 100 % | TEMPERATURE: 97.2 F | HEART RATE: 75 BPM | RESPIRATION RATE: 14 BRPM | BODY MASS INDEX: 23.47 KG/M2 | DIASTOLIC BLOOD PRESSURE: 62 MMHG | HEIGHT: 65 IN

## 2024-03-04 PROBLEM — Z96.9 RETAINED ORTHOPEDIC HARDWARE: Status: ACTIVE | Noted: 2024-03-04

## 2024-03-04 PROCEDURE — 7100000010 HC PHASE II RECOVERY - FIRST 15 MIN: Performed by: ORTHOPAEDIC SURGERY

## 2024-03-04 PROCEDURE — A4217 STERILE WATER/SALINE, 500 ML: HCPCS | Performed by: ORTHOPAEDIC SURGERY

## 2024-03-04 PROCEDURE — 6360000002 HC RX W HCPCS

## 2024-03-04 PROCEDURE — 6360000002 HC RX W HCPCS: Performed by: ORTHOPAEDIC SURGERY

## 2024-03-04 PROCEDURE — 7100000011 HC PHASE II RECOVERY - ADDTL 15 MIN: Performed by: ORTHOPAEDIC SURGERY

## 2024-03-04 PROCEDURE — 7100000001 HC PACU RECOVERY - ADDTL 15 MIN: Performed by: ORTHOPAEDIC SURGERY

## 2024-03-04 PROCEDURE — 7100000000 HC PACU RECOVERY - FIRST 15 MIN: Performed by: ORTHOPAEDIC SURGERY

## 2024-03-04 PROCEDURE — 2580000003 HC RX 258: Performed by: ORTHOPAEDIC SURGERY

## 2024-03-04 PROCEDURE — 3600000004 HC SURGERY LEVEL 4 BASE: Performed by: ORTHOPAEDIC SURGERY

## 2024-03-04 PROCEDURE — 2580000003 HC RX 258: Performed by: ANESTHESIOLOGY

## 2024-03-04 PROCEDURE — 2500000003 HC RX 250 WO HCPCS

## 2024-03-04 PROCEDURE — 3700000000 HC ANESTHESIA ATTENDED CARE: Performed by: ORTHOPAEDIC SURGERY

## 2024-03-04 PROCEDURE — 3600000014 HC SURGERY LEVEL 4 ADDTL 15MIN: Performed by: ORTHOPAEDIC SURGERY

## 2024-03-04 PROCEDURE — 2709999900 HC NON-CHARGEABLE SUPPLY: Performed by: ORTHOPAEDIC SURGERY

## 2024-03-04 PROCEDURE — 3700000001 HC ADD 15 MINUTES (ANESTHESIA): Performed by: ORTHOPAEDIC SURGERY

## 2024-03-04 RX ORDER — PHENYLEPHRINE HCL IN 0.9% NACL 1 MG/10 ML
SYRINGE (ML) INTRAVENOUS PRN
Status: DISCONTINUED | OUTPATIENT
Start: 2024-03-04 | End: 2024-03-04 | Stop reason: SDUPTHER

## 2024-03-04 RX ORDER — ONDANSETRON 2 MG/ML
INJECTION INTRAMUSCULAR; INTRAVENOUS PRN
Status: DISCONTINUED | OUTPATIENT
Start: 2024-03-04 | End: 2024-03-04 | Stop reason: SDUPTHER

## 2024-03-04 RX ORDER — LIDOCAINE HYDROCHLORIDE 20 MG/ML
INJECTION, SOLUTION EPIDURAL; INFILTRATION; INTRACAUDAL; PERINEURAL PRN
Status: DISCONTINUED | OUTPATIENT
Start: 2024-03-04 | End: 2024-03-04 | Stop reason: SDUPTHER

## 2024-03-04 RX ORDER — SODIUM CHLORIDE 9 MG/ML
INJECTION, SOLUTION INTRAVENOUS PRN
Status: DISCONTINUED | OUTPATIENT
Start: 2024-03-04 | End: 2024-03-04 | Stop reason: HOSPADM

## 2024-03-04 RX ORDER — GLYCOPYRROLATE 0.2 MG/ML
INJECTION INTRAMUSCULAR; INTRAVENOUS PRN
Status: DISCONTINUED | OUTPATIENT
Start: 2024-03-04 | End: 2024-03-04 | Stop reason: SDUPTHER

## 2024-03-04 RX ORDER — PROPOFOL 10 MG/ML
INJECTION, EMULSION INTRAVENOUS PRN
Status: DISCONTINUED | OUTPATIENT
Start: 2024-03-04 | End: 2024-03-04 | Stop reason: SDUPTHER

## 2024-03-04 RX ORDER — SODIUM CHLORIDE 0.9 % (FLUSH) 0.9 %
5-40 SYRINGE (ML) INJECTION PRN
Status: DISCONTINUED | OUTPATIENT
Start: 2024-03-04 | End: 2024-03-04 | Stop reason: HOSPADM

## 2024-03-04 RX ORDER — HALOPERIDOL 5 MG/ML
1 INJECTION INTRAMUSCULAR
Status: DISCONTINUED | OUTPATIENT
Start: 2024-03-04 | End: 2024-03-04 | Stop reason: HOSPADM

## 2024-03-04 RX ORDER — BUPIVACAINE HYDROCHLORIDE 5 MG/ML
INJECTION, SOLUTION EPIDURAL; INTRACAUDAL
Status: COMPLETED | OUTPATIENT
Start: 2024-03-04 | End: 2024-03-04

## 2024-03-04 RX ORDER — DIPHENHYDRAMINE HYDROCHLORIDE 50 MG/ML
12.5 INJECTION INTRAMUSCULAR; INTRAVENOUS
Status: DISCONTINUED | OUTPATIENT
Start: 2024-03-04 | End: 2024-03-04 | Stop reason: HOSPADM

## 2024-03-04 RX ORDER — DEXAMETHASONE SODIUM PHOSPHATE 4 MG/ML
INJECTION, SOLUTION INTRA-ARTICULAR; INTRALESIONAL; INTRAMUSCULAR; INTRAVENOUS; SOFT TISSUE PRN
Status: DISCONTINUED | OUTPATIENT
Start: 2024-03-04 | End: 2024-03-04 | Stop reason: SDUPTHER

## 2024-03-04 RX ORDER — FENTANYL CITRATE 0.05 MG/ML
50 INJECTION, SOLUTION INTRAMUSCULAR; INTRAVENOUS EVERY 5 MIN PRN
Status: DISCONTINUED | OUTPATIENT
Start: 2024-03-04 | End: 2024-03-04 | Stop reason: HOSPADM

## 2024-03-04 RX ORDER — MIDAZOLAM HYDROCHLORIDE 1 MG/ML
INJECTION INTRAMUSCULAR; INTRAVENOUS PRN
Status: DISCONTINUED | OUTPATIENT
Start: 2024-03-04 | End: 2024-03-04 | Stop reason: SDUPTHER

## 2024-03-04 RX ORDER — CEPHALEXIN 500 MG/1
500 CAPSULE ORAL 4 TIMES DAILY
Qty: 12 CAPSULE | Refills: 0 | Status: SHIPPED | OUTPATIENT
Start: 2024-03-04 | End: 2024-03-07

## 2024-03-04 RX ORDER — FENTANYL CITRATE 50 UG/ML
INJECTION, SOLUTION INTRAMUSCULAR; INTRAVENOUS PRN
Status: DISCONTINUED | OUTPATIENT
Start: 2024-03-04 | End: 2024-03-04 | Stop reason: SDUPTHER

## 2024-03-04 RX ORDER — MEPERIDINE HYDROCHLORIDE 25 MG/ML
12.5 INJECTION INTRAMUSCULAR; INTRAVENOUS; SUBCUTANEOUS
Status: DISCONTINUED | OUTPATIENT
Start: 2024-03-04 | End: 2024-03-04 | Stop reason: HOSPADM

## 2024-03-04 RX ORDER — MAGNESIUM HYDROXIDE 1200 MG/15ML
LIQUID ORAL CONTINUOUS PRN
Status: COMPLETED | OUTPATIENT
Start: 2024-03-04 | End: 2024-03-04

## 2024-03-04 RX ORDER — SODIUM CHLORIDE 0.9 % (FLUSH) 0.9 %
5-40 SYRINGE (ML) INJECTION EVERY 12 HOURS SCHEDULED
Status: DISCONTINUED | OUTPATIENT
Start: 2024-03-04 | End: 2024-03-04 | Stop reason: HOSPADM

## 2024-03-04 RX ORDER — ACETAMINOPHEN 325 MG/1
650 TABLET ORAL
Status: DISCONTINUED | OUTPATIENT
Start: 2024-03-04 | End: 2024-03-04 | Stop reason: HOSPADM

## 2024-03-04 RX ORDER — EPHEDRINE SULFATE/0.9% NACL/PF 50 MG/5 ML
SYRINGE (ML) INTRAVENOUS PRN
Status: DISCONTINUED | OUTPATIENT
Start: 2024-03-04 | End: 2024-03-04 | Stop reason: SDUPTHER

## 2024-03-04 RX ORDER — LORAZEPAM 2 MG/ML
0.5 INJECTION INTRAMUSCULAR
Status: DISCONTINUED | OUTPATIENT
Start: 2024-03-04 | End: 2024-03-04 | Stop reason: HOSPADM

## 2024-03-04 RX ORDER — ONDANSETRON 2 MG/ML
4 INJECTION INTRAMUSCULAR; INTRAVENOUS
Status: DISCONTINUED | OUTPATIENT
Start: 2024-03-04 | End: 2024-03-04 | Stop reason: HOSPADM

## 2024-03-04 RX ADMIN — Medication 15 MG: at 07:49

## 2024-03-04 RX ADMIN — Medication 200 MCG: at 07:49

## 2024-03-04 RX ADMIN — LIDOCAINE HYDROCHLORIDE 100 MG: 20 INJECTION, SOLUTION EPIDURAL; INFILTRATION; INTRACAUDAL; PERINEURAL at 07:42

## 2024-03-04 RX ADMIN — MIDAZOLAM 2 MG: 1 INJECTION INTRAMUSCULAR; INTRAVENOUS at 07:39

## 2024-03-04 RX ADMIN — Medication 20 MG: at 07:48

## 2024-03-04 RX ADMIN — DEXAMETHASONE SODIUM PHOSPHATE 10 MG: 4 INJECTION, SOLUTION INTRAMUSCULAR; INTRAVENOUS at 07:46

## 2024-03-04 RX ADMIN — SODIUM CHLORIDE: 9 INJECTION, SOLUTION INTRAVENOUS at 06:59

## 2024-03-04 RX ADMIN — ONDANSETRON 4 MG: 2 INJECTION INTRAMUSCULAR; INTRAVENOUS at 07:50

## 2024-03-04 RX ADMIN — SODIUM CHLORIDE 2000 MG: 900 INJECTION INTRAVENOUS at 07:47

## 2024-03-04 RX ADMIN — GLYCOPYRROLATE 0.2 MG: 0.2 INJECTION INTRAMUSCULAR; INTRAVENOUS at 07:39

## 2024-03-04 RX ADMIN — Medication 15 MG: at 07:47

## 2024-03-04 RX ADMIN — PROPOFOL 150 MG: 10 INJECTION, EMULSION INTRAVENOUS at 07:42

## 2024-03-04 RX ADMIN — FENTANYL CITRATE 25 MCG: 50 INJECTION INTRAMUSCULAR; INTRAVENOUS at 07:56

## 2024-03-04 RX ADMIN — Medication 200 MCG: at 07:45

## 2024-03-04 RX ADMIN — Medication 200 MCG: at 07:47

## 2024-03-04 ASSESSMENT — PAIN - FUNCTIONAL ASSESSMENT
PAIN_FUNCTIONAL_ASSESSMENT: NONE - DENIES PAIN
PAIN_FUNCTIONAL_ASSESSMENT: 0-10
PAIN_FUNCTIONAL_ASSESSMENT: NONE - DENIES PAIN

## 2024-03-04 ASSESSMENT — LIFESTYLE VARIABLES: SMOKING_STATUS: 0

## 2024-03-04 NOTE — PROGRESS NOTES
Pt arrived to PACU from OR. Pt awake and alert, on room air. Left ankle dressing C/D/I. +pulses noted. Ice pack applied. Pt denies c/o pain at this time.

## 2024-03-04 NOTE — BRIEF OP NOTE
Brief Postoperative Note      Patient: Dianelys Diaz  YOB: 1970  MRN: [id number]    Date of Procedure: 3/4/2024    Pre-Op Diagnosis Codes:     * Syndesmotic disruption of left ankle [S93.432A]    Post-Op Diagnosis: Same       Procedure(s):  LEFT ANKLE SYNDESMOSIS SCREW REMOVAL TIMES TWO    Surgeon(s):  John Shaw MD    Assistant:  Surgical Assistant: Diandra Yates    Anesthesia: General    Estimated Blood Loss (mL): Minimal    Complications: None    Specimens:   * No specimens in log *    Implants:  * No implants in log *      Drains: * No LDAs found *    Findings: Same.      Electronically signed by John Shaw MD on 3/4/2024 at 8:45 AM

## 2024-03-04 NOTE — DISCHARGE INSTRUCTIONS
Post op instruction:  1- D/C home  2- Dx Left ankle retained syndesmosis screw.  3- Weight Bearing As Tolerated left ankle  4- Elevation surgical site, with ice  5- Keep Drsg dry and clean, 3 days, then BandAid.  6- Follow Up in 2 weeks.       John Shaw MD, 3/4/2024

## 2024-03-04 NOTE — ANESTHESIA POSTPROCEDURE EVALUATION
Department of Anesthesiology  Postprocedure Note    Patient: Dianelys Diaz  MRN: [id number]  YOB: 1970  Date of evaluation: 3/4/2024    Procedure Summary       Date: 03/04/24 Room / Location: [address]    Anesthesia Start: 0739 Anesthesia Stop: 0807    Procedure: LEFT ANKLE SYNDESMOSIS SCREW REMOVAL TIMES TWO (Left: Ankle) Diagnosis:       Syndesmotic disruption of left ankle      (Syndesmotic disruption of left ankle [S93.432A])    Surgeons: John Shaw MD Responsible Provider: Mello Kelly MD    Anesthesia Type: general ASA Status: 3            Anesthesia Type: No value filed.    Db Phase I: Db Score: 10    Db Phase II:      Anesthesia Post Evaluation    Patient location during evaluation: bedside  Patient participation: complete - patient participated  Level of consciousness: awake and alert  Pain score: 0  Nausea & Vomiting: no nausea  Cardiovascular status: hemodynamically stable  Respiratory status: acceptable  Hydration status: stable  Pain management: adequate    No notable events documented.

## 2024-03-04 NOTE — OP NOTE
loosened up and it was then removed.  We removed both the syndesmosis screws and both were intact.  At this point, we let the tourniquet down.  Hemostasis was secured.  We irrigated the incision copiously with normal saline.  We then closed the incision with a 3-0 Vicryl and the skin with a 4-0 Monocryl.  Steri-Strips were then applied.  Dressing was applied with Xeroform, 4x4s, sterile Webril, and Ace wrap.    The patient tolerated the procedure well and was taken to the Recovery in stable condition.    POSTOPERATIVE PLAN:  The patient will be discharged home.  She is allowed to be weightbearing as tolerated, but no heavy impact activities for 4 to 6 weeks.          ARLYN LOPEZ MD      D:  03/04/2024 08:48:45     T:  03/04/2024 10:34:21     /MAEVE  Job #:  [id number]     Doc#:  [id number]

## 2024-03-04 NOTE — ANESTHESIA PRE PROCEDURE
Department of Anesthesiology  Preprocedure Note       Name:  Dianelys Diaz   Age:  53 y.o.  :  1970                                          MRN:  [id number]         Date:  3/4/2024      Surgeon: Surgeon(s):  John Shaw MD    Procedure: Procedure(s):  LEFT ANKLE SYNDESMOSIS SCREW REMOVAL TIMES TWO    Medications prior to admission:   Prior to Admission medications    Medication Sig Start Date End Date Taking? Authorizing Provider   Apixaban (ELIQUIS PO) Take 5 mg by mouth in the morning and 5 mg in the evening.    ProviderAlbert MD   Sacubitril-Valsartan (ENTRESTO PO) 26 mg 2 times daily    Albert Samson MD   spironolactone (ALDACTONE) 25 MG tablet Take 1 tablet by mouth daily    Albert Samson MD   pregabalin (LYRICA) 75 MG capsule Take 1 capsule by mouth 2 times daily.    Albert Samson MD   furosemide (LASIX) 40 MG tablet Take 1 tablet by mouth daily    Albert Samson MD   metoprolol tartrate (LOPRESSOR) 50 MG tablet Take 1 tablet by mouth daily    Albert Samson MD   dapagliflozin (FARXIGA) 10 MG tablet Take 1 tablet by mouth every morning    Albert Samson MD   PARoxetine (PAXIL) 20 MG tablet Take 1.5 tablets by mouth every evening    Albert Samson MD   minocycline (MINOCIN;DYNACIN) 50 MG capsule Take 1 capsule by mouth 2 times daily    Albert Samson MD       Current medications:    Current Facility-Administered Medications   Medication Dose Route Frequency Provider Last Rate Last Admin    sodium chloride flush 0.9 % injection 5-40 mL  5-40 mL IntraVENous 2 times per day Michelle Ibarra MD        sodium chloride flush 0.9 % injection 5-40 mL  5-40 mL IntraVENous PRN Michelle Ibarra MD        0.9 % sodium chloride infusion   IntraVENous PRN Michelle Ibarra MD        ceFAZolin (ANCEF) 2,000 mg in sodium chloride 0.9 % 50 mL IVPB (mini-bag)  2,000 mg IntraVENous On Call to OR John Shaw MD           Allergies:

## 2024-03-04 NOTE — PROGRESS NOTES
Verbal and written discharge instructions were given to the patient and family, patient and family verbalize understanding of discharge instructions including medications orders for home and possible side effects associated with them. Patient instructed and verbalizes understanding to call the doctor listed if they should have any complications or worsening symptoms. Verbalizes understanding about follow-up appointments. D/C IV patient tolerated well, no signs of bleeding. Patient discharged home with all belongings. Out via wheelchair.

## 2024-03-04 NOTE — H&P
Preoperative H&P Update    The patient's History and Physical in the medical record from 2/28/2024 was reviewed by me today.    Past Medical History:   Diagnosis Date    Asthma     exercised induced    Cardiomyopathy (HCC)     CHF (congestive heart failure) (HCC)     Hx of blood clots     Tingling     bilateral hands since back surgery     Past Surgical History:   Procedure Laterality Date    ANKLE FRACTURE SURGERY Left 10/26/2023    LEFT ANKLE OPEN REDUCTION INTERNAL FIXATION FRACTURE DISLOCATION WITH SYNDESMOSIS REPAIR performed by John Shaw MD at [facility] OR    BACK SURGERY      CYST REMOVAL Right     wrist     No current facility-administered medications on file prior to encounter.     Current Outpatient Medications on File Prior to Encounter   Medication Sig Dispense Refill    Apixaban (ELIQUIS PO) Take 5 mg by mouth in the morning and 5 mg in the evening.      Sacubitril-Valsartan (ENTRESTO PO) 26 mg 2 times daily      spironolactone (ALDACTONE) 25 MG tablet Take 1 tablet by mouth daily      pregabalin (LYRICA) 75 MG capsule Take 1 capsule by mouth 2 times daily.      furosemide (LASIX) 40 MG tablet Take 1 tablet by mouth daily      metoprolol tartrate (LOPRESSOR) 50 MG tablet Take 1 tablet by mouth daily      dapagliflozin (FARXIGA) 10 MG tablet Take 1 tablet by mouth every morning      PARoxetine (PAXIL) 20 MG tablet Take 1.5 tablets by mouth every evening      minocycline (MINOCIN;DYNACIN) 50 MG capsule Take 1 capsule by mouth 2 times daily         No Known Allergies   I reviewed the HPI, medications, allergies, reason for surgery, diagnosis and treatment plan and there has been no change.    The patient was evaluated by me today. Physical exam findings for this update include:    Vitals:    03/04/24 0649   BP: 93/64   Pulse: 63   Resp: 18   SpO2: 99%     Airway is intact  Chest: chest clear, no wheezing, rales, normal symmetric air entry, no tachypnea, retractions or cyanosis  Heart: regular rate

## 2024-03-20 ENCOUNTER — OFFICE VISIT (OUTPATIENT)
Dept: ORTHOPEDIC SURGERY | Age: 54
End: 2024-03-20
Payer: COMMERCIAL

## 2024-03-20 VITALS — BODY MASS INDEX: 23.32 KG/M2 | HEIGHT: 65 IN | WEIGHT: 140 LBS

## 2024-03-20 DIAGNOSIS — S82.892A CLOSED FRACTURE DISLOCATION OF LEFT ANKLE, INITIAL ENCOUNTER: Primary | ICD-10-CM

## 2024-03-20 DIAGNOSIS — G57.32 SUPERFICIAL PERONEAL NERVE NEUROPATHY, LEFT: ICD-10-CM

## 2024-03-20 DIAGNOSIS — S93.432D SYNDESMOTIC DISRUPTION OF LEFT ANKLE, SUBSEQUENT ENCOUNTER: ICD-10-CM

## 2024-03-20 PROCEDURE — 99213 OFFICE O/P EST LOW 20 MIN: CPT | Performed by: NURSE PRACTITIONER

## 2024-03-20 NOTE — PROGRESS NOTES
DIAGNOSIS:    1-Left ankle hardware removal, syndesmosis screws x 2.  2-Left ankle superficial peroneal neuropathy    DATE OF SURGERY: 3/4/2024.    HISTORY OF PRESENT ILLNESS:  Ms. Diaz 54 y.o.  female who came in today for 2 weeks postoperative visit.  The patient denies any significant pain in the left ankle. Rates pain a 0/10 VAS and doing much better. She noticed increased ROM after screw removed. She has been WBAT.  She does report numbness or tingling sensation lateral ankle and foot that has been persistent. No fever or Chills.     Past Medical History:   Diagnosis Date    Asthma     exercised induced    Cardiomyopathy (HCC)     CHF (congestive heart failure) (HCC)     Hx of blood clots     Tingling     bilateral hands since back surgery     Past Surgical History:   Procedure Laterality Date    ANKLE FRACTURE SURGERY Left 10/26/2023    LEFT ANKLE OPEN REDUCTION INTERNAL FIXATION FRACTURE DISLOCATION WITH SYNDESMOSIS REPAIR performed by John Shaw MD at [facility] OR    ANKLE SURGERY Left 3/4/2024    LEFT ANKLE SYNDESMOSIS SCREW REMOVAL TIMES TWO performed by John Shaw MD at [facility] OR    BACK SURGERY      CYST REMOVAL Right     wrist     No family history on file.  Social History     Socioeconomic History    Marital status: Single     Spouse name: Not on file    Number of children: Not on file    Years of education: Not on file    Highest education level: Not on file   Occupational History    Not on file   Tobacco Use    Smoking status: Former     Current packs/day: 0.00     Types: Cigarettes     Quit date:      Years since quittin.2    Smokeless tobacco: Never   Vaping Use    Vaping Use: Never used   Substance and Sexual Activity    Alcohol use: Yes     Alcohol/week: 2.0 standard drinks of alcohol     Types: 2 Glasses of wine per week     Comment: social    Drug use: Never    Sexual activity: Not Currently   Other Topics Concern    Not on file   Social History Narrative    Not on

## 2024-04-30 ENCOUNTER — HOSPITAL ENCOUNTER (OUTPATIENT)
Dept: PHYSICAL THERAPY | Age: 54
Setting detail: THERAPIES SERIES
Discharge: HOME | End: 2024-04-30
Payer: COMMERCIAL

## 2024-04-30 DIAGNOSIS — M25.60 LIMITED JOINT RANGE OF MOTION (ROM): ICD-10-CM

## 2024-04-30 DIAGNOSIS — R26.9 GAIT DIFFICULTY: ICD-10-CM

## 2024-04-30 DIAGNOSIS — R60.9 SWELLING: ICD-10-CM

## 2024-04-30 DIAGNOSIS — Z56.89 DIFFICULTY PERFORMING WORK ACTIVITIES: ICD-10-CM

## 2024-04-30 DIAGNOSIS — R68.89 DECREASED EXERCISE TOLERANCE: ICD-10-CM

## 2024-04-30 DIAGNOSIS — R53.1 FUNCTIONAL WEAKNESS: ICD-10-CM

## 2024-04-30 DIAGNOSIS — R52 PAIN: Primary | ICD-10-CM

## 2024-04-30 DIAGNOSIS — G47.9 DIFFICULTY SLEEPING: ICD-10-CM

## 2024-04-30 DIAGNOSIS — R68.89 DECREASED ABILITY TO PERFORM ACTIVITIES: ICD-10-CM

## 2024-04-30 DIAGNOSIS — R68.89 DECREASED ACTIVITY TOLERANCE: ICD-10-CM

## 2024-04-30 DIAGNOSIS — Z78.9 NEED FOR HOME EXERCISE PROGRAM: ICD-10-CM

## 2024-04-30 PROCEDURE — 97162 PT EVAL MOD COMPLEX 30 MIN: CPT

## 2024-04-30 PROCEDURE — 97110 THERAPEUTIC EXERCISES: CPT

## 2024-04-30 NOTE — FLOWSHEET NOTE
[facility]- Outpatient Rehabilitation and Therapy [address], [address], [place] [address] office: [phone] fax: [phone]         Physical Therapy: TREATMENT/PROGRESS NOTE   Patient: Dianelys Diaz (54 y.o. female)   Examination Date: 2024   :  1970 MRN: [id number]   Visit #: Visit count could not be calculated. Make sure you are using a visit which is associated with an episode.   Insurance Allowable Auth Needed   Carelon auth []Yes    []No    Insurance: Payor: BC / Plan: BCBS - [place] PPO / Product Type: *No Product type* /   Insurance ID: [id number] - ([facility])  Secondary Insurance (if applicable):    Treatment Diagnosis:     ICD-10-CM    1. Pain  R52       2. Decreased activity tolerance  R68.89       3. Gait difficulty  R26.9       4. Swelling  R60.9       5. Difficulty sleeping  G47.9       6. Difficulty performing work activities  Z56.89       7. Decreased exercise tolerance  R68.89       8. Limited joint range of motion (ROM)  M25.60       9. Functional weakness  R53.1       10. Decreased ability to perform activities  R68.89       11. Need for home exercise program  Z78.9          Medical Diagnosis:  Other fracture of left lower leg, initial encounter for closed fracture [S82.892A]   Referring Physician: Aliza Mcclelland, APRN -*  PCP: No primary care provider on file.     Plan of care signed (Y/N):     Date of Patient follow up with Physician:      Progress Report/POC: EVAL today  POC update due: (10 visits /OR AUTH LIMITS, whichever is less) 24,  2024                                             Precautions/ Contra-indications:           Latex allergy:  NO  Pacemaker:    NO  Contraindications for Manipulation: NA  Date of Surgery: ORIF Oct 2023, syndesmosis removal M  Other:    Red Flags:  None    C-SSRS Triggered by Intake questionnaire:   Patient answered 'NO' to both behavioral questions on intake.  No further screening warranted    Preferred

## 2024-04-30 NOTE — PLAN OF CARE
[facility]- Outpatient Rehabilitation and Therapy [address], [place] [address] office: [phone] fax: [phone]     Physical Therapy Initial Evaluation Certification      Dear Aliza Mcclelland, PHILIP -*,    We had the pleasure of evaluating the following patient for physical therapy services at [facility].  A summary of our findings can be found in the initial assessment below.  This includes our plan of care.  If you have any questions or concerns regarding these findings, please do not hesitate to contact me at the office phone number listed above.  Thank you for the referral.     Physician Signature:_______________________________Date:__________________  By signing above (or electronic signature), therapist’s plan is approved by physician       Physical Therapy: TREATMENT/PROGRESS NOTE   Patient: Dianelys Diaz (54 y.o. female)   Examination Date: 2024   :  1970 MRN: [id number]   Visit #: Visit count could not be calculated. Make sure you are using a visit which is associated with an episode.   Insurance Allowable Auth Needed   Carelon auth []Yes    []No    Insurance: Payor: BCBS / Plan: BCBS - [place] PPO / Product Type: *No Product type* /   Insurance ID: [id number] - ([facility])  Secondary Insurance (if applicable):    Treatment Diagnosis:     ICD-10-CM    1. Pain  R52       2. Decreased activity tolerance  R68.89       3. Gait difficulty  R26.9       4. Swelling  R60.9       5. Difficulty sleeping  G47.9       6. Difficulty performing work activities  Z56.89       7. Decreased exercise tolerance  R68.89       8. Limited joint range of motion (ROM)  M25.60       9. Functional weakness  R53.1       10. Decreased ability to perform activities  R68.89       11. Need for home exercise program  Z78.9          Medical Diagnosis:  Other fracture of left lower leg, initial encounter for closed fracture [S82.892A]   Referring Physician:

## 2024-05-09 ENCOUNTER — HOSPITAL ENCOUNTER (OUTPATIENT)
Dept: PHYSICAL THERAPY | Age: 54
Setting detail: THERAPIES SERIES
Discharge: HOME | End: 2024-05-09
Payer: COMMERCIAL

## 2024-05-09 PROCEDURE — 97112 NEUROMUSCULAR REEDUCATION: CPT

## 2024-05-09 PROCEDURE — 97140 MANUAL THERAPY 1/> REGIONS: CPT

## 2024-05-09 PROCEDURE — 97110 THERAPEUTIC EXERCISES: CPT

## 2024-05-09 NOTE — FLOWSHEET NOTE
tendon 3+  Achilles: 3+ B  Clonus abnormal 1-2 beats  Babinski: neg    Specific Joint Mobility Testing/Accessory Motions:      Foot/Ankle:  hypomobile    Special Tests:  30s STS: 7x  SLS: L= 8sec, R = <4sec    Gait:    Pattern: antalgic pattern and Increased VIPUL  Assistive Device Used: no AD    Balance:  [x] WNL      [] NT       [] Dysfunction noted  Comment:     Falls Risk Assessment (30 days):   Deferred formal TUG testing  Time Up and Go (TUG):   Not Assessed        Exercises/Interventions     Therapeutic Ex (36543)  Resistance Sets/ Reps/ Time Completed Notes/Cues/Progressions   cardio bike 5' x L3; monitor   AAROM/ AROM              Ankle TB  Grn 3 way (DF/Inv/Ev) X20 ea x                  Calf raise @ step 2x10 x    squat 5# goblet 2x10 x @ chair          Lunge retro   >    steps   >add           Pt Edu   x Role of specific ex's for ankle stability/ strength/ function   Manual Intervention (83112)  TIME     Ankle manual  10' x TC dist, rearfoot mobs, DF str/ mobs                               NMR re-education (46499) Resistance Sets/ Reps/ Time Completed  CUES NEEDED   CKC DF self mob   >prn    Tandem stance W/ ball ABC's @ 90* flexion 2x ea x    Airex balance   > Future add ball tosses   Tandem walking  20' x 2 laps x    Sport cord marching 4 way x20ea x                  Therapeutic Activity (83269)  Sets/time                       Modalities:    No modalities applied this session    Education/Home Exercise Program: Access Code: [email or web address]  URL: [email or web address]  Date: 04/30/2024  Prepared by: Kendrick Chavis    Exercises  - Heel Toe Raises with Counter Support  - 2-3 x daily - 4-6 x weekly - 2-3 sets - 10-15 reps  - Squat with Chair Touch  - 2-3 x daily - 4-6 x weekly - 2-3 sets - 10-15 reps  - Tandem Stance with Support  - 2-3 x daily - 4-6 x weekly - 3-5 reps - 10-20sec hold        ASSESSMENT     Today's Assessment:  Pt amena session well. Mild fatigue of ankle and lower quarter. Mild/

## 2024-05-14 ENCOUNTER — APPOINTMENT (OUTPATIENT)
Dept: PHYSICAL THERAPY | Age: 54
End: 2024-05-14
Payer: COMMERCIAL

## 2024-05-16 ENCOUNTER — HOSPITAL ENCOUNTER (OUTPATIENT)
Dept: PHYSICAL THERAPY | Age: 54
Setting detail: THERAPIES SERIES
Discharge: HOME | End: 2024-05-16
Payer: COMMERCIAL

## 2024-05-16 PROCEDURE — 97112 NEUROMUSCULAR REEDUCATION: CPT

## 2024-05-16 PROCEDURE — 97110 THERAPEUTIC EXERCISES: CPT

## 2024-05-16 NOTE — FLOWSHEET NOTE
ankle ORIF and subsequent syndesmosis screw removal on 3/4/24. She is currently walking in gym shoes, and has mild limp related to ankle limited mobility and DF rocker, but primary gait deviation is antalgic gait related to myelopathy. She fell and  her ankle initially in the fall 2023 while attending a festival. She also had cervical fusion in 2021 and had myelopathy with UE and LE weakness and continues to have weakness related to that with difficulty holding a pen, and antalgic gait, but denies any acute bowel/ bladder changes. Prior to cervical surgery, she was found to have: large, 4.5 cm broad-based central disc protrusion involving C4-5, superimposed on broad-based posterior disc surface complex causing severe deformity of the thecal sac.  Also has learned about a year ago, has been diagnosed with CHF with approx 20%-40% ejection fraction and easily gets SOB. She is being followed by [facility] for cardiology. Per medical record notes, cardio is ok with her doing light to moderate exercise; closely monitoring symptoms. Since her neck surgery in 2021, she was only able to restore function approximately walking a mile with symptoms of SOB and fatigue. Prior to neck surgery, she was an avid running, and was very active. She hopes to restore her function as best as she can walking a few miles, and possibly doing trail walks on uneven terrain.     She hopes to restore her function and mobility to prior levels.     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    Also has some troubles with memory per medical record review, but this was not discussed today.     *ankle DF mobility deficit  *ankle strength/ stamina deficit  *R lateral hip pain  *cervical myelopathy residual symptoms  *cardiac poor ejection fraction with SOB  *recent 5lb wt gain; concerned about CHF; will call doctor is not better in a week.  SUBJECTIVE

## 2024-05-21 ENCOUNTER — HOSPITAL ENCOUNTER (OUTPATIENT)
Dept: PHYSICAL THERAPY | Age: 54
Setting detail: THERAPIES SERIES
Discharge: HOME | End: 2024-05-21
Payer: COMMERCIAL

## 2024-05-21 PROCEDURE — 97112 NEUROMUSCULAR REEDUCATION: CPT

## 2024-05-21 PROCEDURE — 97110 THERAPEUTIC EXERCISES: CPT

## 2024-05-21 NOTE — FLOWSHEET NOTE
[facility]- Outpatient Rehabilitation and Therapy [address], [address], [place] [address] office: [phone] fax: [phone]         Physical Therapy: TREATMENT/PROGRESS NOTE   Patient: Dianelys Diaz (54 y.o. female)   Examination Date: 2024   :  1970 MRN: [id number]   Visit #: 4   Insurance Allowable Auth Needed   Carelon auth []Yes    []No    Insurance: Payor: BCBS / Plan: BCBS - [place] PPO / Product Type: *No Product type* /   Insurance ID: [id number] - ([facility])  Secondary Insurance (if applicable):    Treatment Diagnosis:     ICD-10-CM    1. Pain  R52       2. Decreased activity tolerance  R68.89       3. Gait difficulty  R26.9       4. Swelling  R60.9       5. Difficulty sleeping  G47.9       6. Difficulty performing work activities  Z56.89       7. Decreased exercise tolerance  R68.89       8. Limited joint range of motion (ROM)  M25.60       9. Functional weakness  R53.1       10. Decreased ability to perform activities  R68.89       11. Need for home exercise program  Z78.9          Medical Diagnosis:  Other fracture of left lower leg, initial encounter for closed fracture [S82.892A]   Referring Physician: Aliza Mcclelland, PHILIP -*  PCP: No primary care provider on file.     Plan of care signed (Y/N):     Date of Patient follow up with Physician:      Progress Report/POC: NO  POC update due: (10 visits /OR AUTH LIMITS, whichever is less) 24,  2024                                             Precautions/ Contra-indications:           Latex allergy:  NO  Pacemaker:    NO  Contraindications for Manipulation: NA  Date of Surgery: ORIF Oct 2023, syndesmosis removal M  Other:    Red Flags:  None    C-SSRS Triggered by Intake questionnaire:   Patient answered 'NO' to both behavioral questions on intake.  No further screening warranted    Preferred Language for Healthcare:   [x] English       [] other:  Patient stated complaint: Pt here for evaluation s/p L

## 2024-05-23 ENCOUNTER — HOSPITAL ENCOUNTER (OUTPATIENT)
Dept: PHYSICAL THERAPY | Age: 54
Setting detail: THERAPIES SERIES
Discharge: HOME | End: 2024-05-23
Payer: COMMERCIAL

## 2024-05-23 PROCEDURE — 97112 NEUROMUSCULAR REEDUCATION: CPT

## 2024-05-23 PROCEDURE — 97110 THERAPEUTIC EXERCISES: CPT

## 2024-05-23 NOTE — FLOWSHEET NOTE
[facility]- Outpatient Rehabilitation and Therapy [address], [address], [place] [address] office: [phone] fax: [phone]      Physical Therapy: TREATMENT/PROGRESS NOTE   Patient: Dianelys Diaz (54 y.o. female)   Examination Date: 2024   :  1970 MRN: [id number]   Visit #: 5   Insurance Allowable Auth Needed   Carelon auth []Yes    []No    Insurance: Payor: BCBS / Plan: BCBS [place] PPO / Product Type: *No Product type* /   Insurance ID: [id number] - ([facility])  Secondary Insurance (if applicable):    Treatment Diagnosis:     ICD-10-CM    1. Pain  R52       2. Decreased activity tolerance  R68.89       3. Gait difficulty  R26.9       4. Swelling  R60.9       5. Difficulty sleeping  G47.9       6. Difficulty performing work activities  Z56.89       7. Decreased exercise tolerance  R68.89       8. Limited joint range of motion (ROM)  M25.60       9. Functional weakness  R53.1       10. Decreased ability to perform activities  R68.89       11. Need for home exercise program  Z78.9          Medical Diagnosis:  Other fracture of left lower leg, initial encounter for closed fracture [S82.892A]   Referring Physician: Aliza Mcclelland, PHILIP -*  PCP: No primary care provider on file.     Plan of care signed (Y/N):     Date of Patient follow up with Physician:      Progress Report/POC: NO  POC update due: (10 visits /OR AUTH LIMITS, whichever is less) 24,  2024                                             Precautions/ Contra-indications:           Latex allergy:  NO  Pacemaker:    NO  Contraindications for Manipulation: NA  Date of Surgery: ORIF Oct 2023, syndesmosis removal M  Other:    Red Flags:  None    C-SSRS Triggered by Intake questionnaire:   Patient answered 'NO' to both behavioral questions on intake.  No further screening warranted    Preferred Language for Healthcare:   [x] English       [] other:  Patient stated complaint: Pt here for evaluation s/p L ankle

## 2024-05-29 ENCOUNTER — HOSPITAL ENCOUNTER (OUTPATIENT)
Dept: PHYSICAL THERAPY | Age: 54
Setting detail: THERAPIES SERIES
Discharge: HOME | End: 2024-05-29
Payer: COMMERCIAL

## 2024-05-29 PROCEDURE — 97110 THERAPEUTIC EXERCISES: CPT

## 2024-05-29 PROCEDURE — 97112 NEUROMUSCULAR REEDUCATION: CPT

## 2024-05-29 NOTE — FLOWSHEET NOTE
Patient will demonstrate increased AROM of DF to symmetrical without pain to allow for proper joint functioning to enable patient to descend stairs.   [] Progressing: [] Met: [] Not Met: [] Adjusted  3. Patient will demonstrate increased Strength of ankle to at least 5/5 throughout without pain to allow for proper functional mobility to enable patient to return to wallking and stairs.   [] Progressing: [] Met: [] Not Met: [] Adjusted  4. Patient will return to regular exercise program without increased symptoms or restriction.   [] Progressing: [] Met: [] Not Met: [] Adjusted  5. Balance single leg for at least 10 sec each to achieve near norms (patient specific functional goal)    [] Progressing: [] Met: [] Not Met: [] Adjusted     Overall Progression Towards Functional goals/ Treatment Progress Update:  [x] Patient is progressing as expected towards functional goals listed.    [] Progression is slowed due to complexities/Impairments listed.  [] Progression has been slowed due to co-morbidities.  [] Plan just implemented, too soon (<30days) to assess goals progression   [] Goals require adjustment due to lack of progress  [] Patient is not progressing as expected and requires additional follow up with physician  [] Other:     TREATMENT PLAN     Frequency/Duration: 1-2x/week for  5-10  weeks for the following treatment interventions:    Interventions:  Therapeutic Exercise (04884) including: strength training, ROM, and functional mobility  Therapeutic Activities (88367) including: functional mobility training and education.  Neuromuscular Re-education (59991) activation and proprioception, including postural re-education.    Gait Training (98734) for normalization of ambulation patterns and AD training.   Manual Therapy (28945) as indicated to include: Passive Range of Motion, Gr I-IV mobilizations, Grade V Manipulation, and Soft Tissue Mobilization  Modalities as needed that may include: Cryotherapy    Plan: Cont

## 2024-05-31 ENCOUNTER — HOSPITAL ENCOUNTER (OUTPATIENT)
Dept: PHYSICAL THERAPY | Age: 54
Setting detail: THERAPIES SERIES
Discharge: HOME | End: 2024-05-31
Payer: COMMERCIAL

## 2024-05-31 PROCEDURE — 97110 THERAPEUTIC EXERCISES: CPT

## 2024-05-31 PROCEDURE — 97112 NEUROMUSCULAR REEDUCATION: CPT

## 2024-05-31 NOTE — FLOWSHEET NOTE
for exercise.  [] Progressing: [] Met: [] Not Met: [] Adjusted  2. Patient will demonstrate increased AROM of DF to symmetrical without pain to allow for proper joint functioning to enable patient to descend stairs.   [] Progressing: [] Met: [] Not Met: [] Adjusted  3. Patient will demonstrate increased Strength of ankle to at least 5/5 throughout without pain to allow for proper functional mobility to enable patient to return to wallking and stairs.   [] Progressing: [] Met: [] Not Met: [] Adjusted  4. Patient will return to regular exercise program without increased symptoms or restriction.   [] Progressing: [] Met: [] Not Met: [] Adjusted  5. Balance single leg for at least 10 sec each to achieve near norms (patient specific functional goal)    [] Progressing: [] Met: [] Not Met: [] Adjusted     Overall Progression Towards Functional goals/ Treatment Progress Update:  [x] Patient is progressing as expected towards functional goals listed.    [] Progression is slowed due to complexities/Impairments listed.  [] Progression has been slowed due to co-morbidities.  [] Plan just implemented, too soon (<30days) to assess goals progression   [] Goals require adjustment due to lack of progress  [] Patient is not progressing as expected and requires additional follow up with physician  [] Other:     TREATMENT PLAN     Frequency/Duration: 1-2x/week for  5-10  weeks for the following treatment interventions:    Interventions:  Therapeutic Exercise (82766) including: strength training, ROM, and functional mobility  Therapeutic Activities (40106) including: functional mobility training and education.  Neuromuscular Re-education (82593) activation and proprioception, including postural re-education.    Gait Training (15003) for normalization of ambulation patterns and AD training.   Manual Therapy (68708) as indicated to include: Passive Range of Motion, Gr I-IV mobilizations, Grade V Manipulation, and Soft Tissue

## 2024-06-04 ENCOUNTER — HOSPITAL ENCOUNTER (OUTPATIENT)
Dept: PHYSICAL THERAPY | Age: 54
Setting detail: THERAPIES SERIES
Discharge: HOME | End: 2024-06-04
Payer: COMMERCIAL

## 2024-06-04 PROCEDURE — 97112 NEUROMUSCULAR REEDUCATION: CPT

## 2024-06-04 PROCEDURE — 97110 THERAPEUTIC EXERCISES: CPT

## 2024-06-04 NOTE — FLOWSHEET NOTE
[facility] [address], [address], [place] [address] office: [phone] fax: [phone]      Physical Therapy: TREATMENT/PROGRESS NOTE   Patient: Dianelys Diaz (54 y.o. female)   Examination Date: 2024   :  1970 MRN: [id number]   Visit #: 8   Insurance Allowable Auth Needed   [employer] auth []Yes    []No    Insurance: Payor: BCBS / Plan: BCBS - [place] PPO / Product Type: *No Product type* /   Insurance ID: [id number] - ([facility])  Secondary Insurance (if applicable):    Treatment Diagnosis:     ICD-10-CM    1. Pain  R52       2. Decreased activity tolerance  R68.89       3. Gait difficulty  R26.9       4. Swelling  R60.9       5. Difficulty sleeping  G47.9       6. Difficulty performing work activities  Z56.89       7. Decreased exercise tolerance  R68.89       8. Limited joint range of motion (ROM)  M25.60       9. Functional weakness  R53.1       10. Decreased ability to perform activities  R68.89       11. Need for home exercise program  Z78.9          Medical Diagnosis:  Other fracture of left lower leg, initial encounter for closed fracture [S82.892A]   Referring Physician: Aliza Mcclelland, PHILIP -*  PCP: No primary care provider on file.     Plan of care signed (Y/N):     Date of Patient follow up with Physician:      Progress Report/POC: NO  POC update due: (10 visits /OR AUTH LIMITS, whichever is less) 24,  2024                                             Precautions/ Contra-indications:           Latex allergy:  NO  Pacemaker:    NO  Contraindications for Manipulation: NA  Date of Surgery: ORIF Oct 2023, syndesmosis removal M  Other:    Red Flags:  None    C-SSRS Triggered by Intake questionnaire:   Patient answered 'NO' to both behavioral questions on intake.  No further screening warranted    Preferred Language for Healthcare:   [x] English       [] other:  Patient stated complaint: Pt here for evaluation s/p L ankle

## 2024-06-06 ENCOUNTER — HOSPITAL ENCOUNTER (OUTPATIENT)
Dept: PHYSICAL THERAPY | Age: 54
Setting detail: THERAPIES SERIES
Discharge: HOME | End: 2024-06-06
Payer: COMMERCIAL

## 2024-06-06 PROCEDURE — 97110 THERAPEUTIC EXERCISES: CPT

## 2024-06-06 PROCEDURE — 97112 NEUROMUSCULAR REEDUCATION: CPT

## 2024-06-06 NOTE — FLOWSHEET NOTE
minutes; neuromuscular reeducation of movement, balance, coordination, kinesthetic sense, posture, and/or proprioception for sitting and/or standing activities    GOALS     Patient stated goal: able to return to walking program >1 mile  [] Progressing: [x] Met: [] Not Met: [] Adjusted    Therapist goals for Patient:   Short Term Goals: To be achieved in: 2 weeks  1. Independent in HEP and progression per patient tolerance, in order to prevent re-injury.   [] Progressing: [x] Met: [] Not Met: [] Adjusted  2. Patient will have a decrease in pain to <1/10 to facilitate improvement in movement, function, and ADLs as indicated by Functional Deficits.  [] Progressing: [x] Met: [] Not Met: [] Adjusted    Long Term Goals: To be achieved in: 5-10 weeks  1. Disability index score of 20% or less for the LEFS to assist with reaching prior level of function with activities such as stairs and walking for exercise.  [] Progressing: [] Met: [x] Not Met: [] Adjusted  2. Patient will demonstrate increased AROM of DF to symmetrical without pain to allow for proper joint functioning to enable patient to descend stairs.   [] Progressing: [x] Met: [] Not Met: [] Adjusted  3. Patient will demonstrate increased Strength of ankle to at least 5/5 throughout without pain to allow for proper functional mobility to enable patient to return to wallking and stairs.   [] Progressing: [x] Met: [] Not Met: [] Adjusted  4. Patient will return to regular exercise program without increased symptoms or restriction.   [] Progressing: [x] Met: [] Not Met: [] Adjusted  5. Balance single leg for at least 10 sec each to achieve near norms (patient specific functional goal)    [] Progressing: [x] Met: [] Not Met: [] Adjusted     Overall Progression Towards Functional goals/ Treatment Progress Update:  [x] Patient is progressing as expected towards functional goals listed.    [] Progression is slowed due to complexities/Impairments listed.  [] Progression has

## 2025-07-15 ENCOUNTER — HOSPITAL ENCOUNTER (OUTPATIENT)
Dept: CT IMAGING | Age: 55
Discharge: HOME | End: 2025-07-15
Payer: COMMERCIAL

## 2025-07-15 ENCOUNTER — HOSPITAL ENCOUNTER (OUTPATIENT)
Age: 55
Discharge: HOME | End: 2025-07-15
Payer: COMMERCIAL

## 2025-07-15 DIAGNOSIS — R10.9 RIGHT FLANK PAIN: ICD-10-CM

## 2025-07-15 DIAGNOSIS — R30.0 DYSURIA: ICD-10-CM

## 2025-07-15 DIAGNOSIS — Z00.00 PHYSICAL EXAM: ICD-10-CM

## 2025-07-15 DIAGNOSIS — R10.31 RLQ ABDOMINAL PAIN: ICD-10-CM

## 2025-07-15 LAB
ALBUMIN SERPL-MCNC: 4.5 G/DL (ref 3.4–5)
ALBUMIN/GLOB SERPL: 1.6 {RATIO} (ref 1.1–2.2)
ALP SERPL-CCNC: 72 U/L (ref 40–129)
ALT SERPL-CCNC: ABNORMAL U/L (ref 10–40)
ANION GAP SERPL CALCULATED.3IONS-SCNC: 15 MMOL/L (ref 3–16)
AST SERPL-CCNC: 29 U/L (ref 15–37)
BASOPHILS # BLD: 0 K/UL (ref 0–0.2)
BASOPHILS NFR BLD: 1 %
BILIRUB SERPL-MCNC: 0.7 MG/DL (ref 0–1)
BUN SERPL-MCNC: 12 MG/DL (ref 7–20)
CALCIUM SERPL-MCNC: 9.8 MG/DL (ref 8.3–10.6)
CHLORIDE SERPL-SCNC: 95 MMOL/L (ref 99–110)
CHOLEST SERPL-MCNC: 209 MG/DL (ref 0–199)
CO2 SERPL-SCNC: 26 MMOL/L (ref 21–32)
CREAT SERPL-MCNC: 1 MG/DL (ref 0.6–1.1)
DEPRECATED RDW RBC AUTO: 13.3 % (ref 12.4–15.4)
EOSINOPHIL # BLD: 0.1 K/UL (ref 0–0.6)
EOSINOPHIL NFR BLD: 1.6 %
EST. AVERAGE GLUCOSE BLD GHB EST-MCNC: 102.5 MG/DL
GFR SERPLBLD CREATININE-BSD FMLA CKD-EPI: 66 ML/MIN/{1.73_M2}
GLUCOSE SERPL-MCNC: 91 MG/DL (ref 70–99)
HBA1C MFR BLD: 5.2 %
HCT VFR BLD AUTO: 44.7 % (ref 36–48)
HDLC SERPL-MCNC: 72 MG/DL (ref 40–60)
HGB BLD-MCNC: 15.3 G/DL (ref 12–16)
LDLC SERPL CALC-MCNC: 111 MG/DL
LIPASE SERPL-CCNC: 32 U/L (ref 13–60)
LYMPHOCYTES # BLD: 1.2 K/UL (ref 1–5.1)
LYMPHOCYTES NFR BLD: 30 %
MCH RBC QN AUTO: 32.5 PG (ref 26–34)
MCHC RBC AUTO-ENTMCNC: 34.3 G/DL (ref 31–36)
MCV RBC AUTO: 94.8 FL (ref 80–100)
MONOCYTES # BLD: 0.4 K/UL (ref 0–1.3)
MONOCYTES NFR BLD: 10 %
NEUTROPHILS # BLD: 2.3 K/UL (ref 1.7–7.7)
NEUTROPHILS NFR BLD: 57.4 %
PERFORMED ON: NORMAL
PLATELET # BLD AUTO: 258 K/UL (ref 135–450)
PMV BLD AUTO: 8.4 FL (ref 5–10.5)
POC CREATININE: 1 MG/DL (ref 0.6–1.1)
POC SAMPLE TYPE: NORMAL
POTASSIUM SERPL-SCNC: ABNORMAL MMOL/L (ref 3.5–5.1)
PROT SERPL-MCNC: 7.3 G/DL (ref 6.4–8.2)
RBC # BLD AUTO: 4.72 M/UL (ref 4–5.2)
REASON FOR REJECTION: NORMAL
REJECTED TEST: NORMAL
SODIUM SERPL-SCNC: 136 MMOL/L (ref 136–145)
TRIGL SERPL-MCNC: 131 MG/DL (ref 0–150)
TSH SERPL DL<=0.005 MIU/L-ACNC: 1.85 UIU/ML (ref 0.27–4.2)
VLDLC SERPL CALC-MCNC: 26 MG/DL
WBC # BLD AUTO: 4 K/UL (ref 4–11)

## 2025-07-15 PROCEDURE — 82565 ASSAY OF CREATININE: CPT

## 2025-07-15 PROCEDURE — 80053 COMPREHEN METABOLIC PANEL: CPT

## 2025-07-15 PROCEDURE — 74177 CT ABD & PELVIS W/CONTRAST: CPT

## 2025-07-15 PROCEDURE — 83036 HEMOGLOBIN GLYCOSYLATED A1C: CPT

## 2025-07-15 PROCEDURE — 80061 LIPID PANEL: CPT

## 2025-07-15 PROCEDURE — 84443 ASSAY THYROID STIM HORMONE: CPT

## 2025-07-15 PROCEDURE — 83690 ASSAY OF LIPASE: CPT

## 2025-07-15 PROCEDURE — 85025 COMPLETE CBC W/AUTO DIFF WBC: CPT

## 2025-07-15 PROCEDURE — 6360000004 HC RX CONTRAST MEDICATION: Performed by: NURSE PRACTITIONER

## 2025-07-15 RX ORDER — IOPAMIDOL 755 MG/ML
75 INJECTION, SOLUTION INTRAVASCULAR
Status: COMPLETED | OUTPATIENT
Start: 2025-07-15 | End: 2025-07-15

## 2025-07-15 RX ADMIN — IOPAMIDOL 75 ML: 755 INJECTION, SOLUTION INTRAVENOUS at 13:57

## 2025-08-26 ENCOUNTER — OFFICE VISIT (OUTPATIENT)
Dept: UROGYNECOLOGY | Age: 55
End: 2025-08-26
Payer: COMMERCIAL

## 2025-08-26 VITALS
HEART RATE: 71 BPM | RESPIRATION RATE: 16 BRPM | SYSTOLIC BLOOD PRESSURE: 97 MMHG | TEMPERATURE: 98.7 F | DIASTOLIC BLOOD PRESSURE: 66 MMHG | OXYGEN SATURATION: 97 %

## 2025-08-26 DIAGNOSIS — R33.9 URINARY RETENTION: ICD-10-CM

## 2025-08-26 DIAGNOSIS — N39.8 VOIDING DYSFUNCTION: ICD-10-CM

## 2025-08-26 DIAGNOSIS — M62.89 PELVIC FLOOR DYSFUNCTION: Primary | ICD-10-CM

## 2025-08-26 DIAGNOSIS — R39.15 URGENCY OF URINATION: ICD-10-CM

## 2025-08-26 LAB
BACTERIA URNS QL MICRO: ABNORMAL /HPF
BILIRUB UR QL STRIP.AUTO: NEGATIVE
BILIRUBIN, POC: NORMAL
BLOOD URINE, POC: NORMAL
CLARITY UR: CLEAR
CLARITY, POC: CLEAR
COLOR UR: YELLOW
COLOR, POC: YELLOW
EMPTY COUGH STRESS TEST: NORMAL
EPI CELLS #/AREA URNS AUTO: 1 /HPF (ref 0–5)
FIRST SENSATION: 50 CC
FULL COUGH STRESS TEST: NORMAL
GLUCOSE UR STRIP.AUTO-MCNC: >=1000 MG/DL
GLUCOSE URINE, POC: NORMAL MG/DL
HGB UR QL STRIP.AUTO: NEGATIVE
HYALINE CASTS #/AREA URNS AUTO: 0 /LPF (ref 0–8)
KETONES UR STRIP.AUTO-MCNC: NEGATIVE MG/DL
KETONES, POC: NORMAL MG/DL
LEUKOCYTE EST, POC: NORMAL
LEUKOCYTE ESTERASE UR QL STRIP.AUTO: NEGATIVE
MAX SENSATION: 350 CC
NITRATE, URINE POC: NORMAL
NITRITE UR QL STRIP.AUTO: NEGATIVE
NITRITE, POC: NORMAL
PH UR STRIP.AUTO: 7 [PH] (ref 5–8)
PH, POC: 7
POST VOID RESIDUAL (PVR): 100 ML
PROT UR STRIP.AUTO-MCNC: NEGATIVE MG/DL
PROTEIN, POC: NORMAL MG/DL
RBC CLUMPS #/AREA URNS AUTO: 0 /HPF (ref 0–4)
RBC URINE, POC: NORMAL
SECOND SENSATION: 150 CC
SP GR UR STRIP.AUTO: 1.02 (ref 1–1.03)
SPASM: NORMAL
SPECIFIC GRAVITY, POC: 1
UA DIPSTICK W REFLEX MICRO PNL UR: ABNORMAL
URN SPEC COLLECT METH UR: ABNORMAL
UROBILINOGEN UR STRIP-ACNC: 0.2 E.U./DL
UROBILINOGEN, POC: NORMAL MG/DL
WBC #/AREA URNS AUTO: 0 /HPF (ref 0–5)
WBC URINE, POC: NORMAL

## 2025-08-26 PROCEDURE — 99204 OFFICE O/P NEW MOD 45 MIN: CPT | Performed by: NURSE PRACTITIONER

## 2025-08-26 PROCEDURE — 81002 URINALYSIS NONAUTO W/O SCOPE: CPT | Performed by: NURSE PRACTITIONER

## 2025-08-26 PROCEDURE — 51725 SIMPLE CYSTOMETROGRAM: CPT | Performed by: NURSE PRACTITIONER

## 2025-08-27 LAB — BACTERIA UR CULT: NORMAL

## (undated) DEVICE — DRESSING,GAUZE,XEROFORM,CURAD,1"X8",ST: Brand: CURAD

## (undated) DEVICE — C-ARM PACK: Brand: C-ARM COVER

## (undated) DEVICE — SOLUTION IRRIG 500ML 0.9% SOD CHLO USP POUR PLAS BTL

## (undated) DEVICE — GLOVE SURG 9 PF CRM STRL SENSICARE PI MIC LF

## (undated) DEVICE — SUTURE MCRYL + SZ 4-0 L27IN ABSRB UD L19MM PS-2 3/8 CIR MCP426H

## (undated) DEVICE — GLOVE SURG SZ 65 L12IN THK75MIL DK GRN LTX FREE

## (undated) DEVICE — PADDING CAST W4INXL4YD ST COT RAYON MICROPLEATED HIGHLY

## (undated) DEVICE — STRIP,CLOSURE,WOUND,MEDI-STRIP,1/2X4: Brand: MEDLINE

## (undated) DEVICE — GLOVE SURG SZ 8 CRM LTX FREE POLYISOPRENE POLYMER BEAD ANTI

## (undated) DEVICE — BANDAGE,ELASTIC,ESMARK,STERILE,6"X9',LF: Brand: MEDLINE

## (undated) DEVICE — T-DRAPE,EXTREMITY,STERILE: Brand: MEDLINE

## (undated) DEVICE — BIT DRL L100MM DIA2.5MM FOR SM FRAG UNIV LOK SYS

## (undated) DEVICE — DRAPE,U/ SHT,SPLIT,PLAS,STERIL: Brand: MEDLINE

## (undated) DEVICE — BANDAGE COMPR W6INXL10YD ST M E WHITE/BEIGE

## (undated) DEVICE — SPONGE LAP W18XL18IN WHT COT 4 PLY FLD STRUNG RADPQ DISP ST 2 PER PACK

## (undated) DEVICE — SUTURE VCRL + SZ 2-0 L18IN ABSRB UD CT1 L36MM 1/2 CIR VCP839D

## (undated) DEVICE — GLOVE SURG SZ 65 THK91MIL LTX FREE SYN POLYISOPRENE

## (undated) DEVICE — GLOVE ORANGE PI 8   MSG9080

## (undated) DEVICE — MASTISOL ADHESIVE LIQ 2/3ML

## (undated) DEVICE — TOWEL,OR,DSP,ST,BLUE,STD,4/PK,20PK/CS: Brand: MEDLINE

## (undated) DEVICE — SUTURE VCRL + SZ 3-0 L18IN ABSRB UD SH 1/2 CIR TAPERCUT NDL VCP864D

## (undated) DEVICE — Z DISCONTINUED GLOVE SURG SZ 7 L12IN FNGR THK13MIL WHT ISOLEX POLYISOPRENE

## (undated) DEVICE — SYRINGE IRRIG 60ML SFT PLIABLE BLB EZ TO GRP 1 HND USE W/

## (undated) DEVICE — BIT DRL DIA2MM STD QUIK CONN FOR PERIARTC LOK PLT SYS

## (undated) DEVICE — PADDING,UNDERCAST,COTTON, 4"X4YD STERILE: Brand: MEDLINE

## (undated) DEVICE — INTENDED FOR TISSUE SEPARATION, AND OTHER PROCEDURES THAT REQUIRE A SHARP SURGICAL BLADE TO PUNCTURE OR CUT.: Brand: BARD-PARKER ® STAINLESS STEEL BLADES

## (undated) DEVICE — NEEDLE HYPO 23GA L1.5IN TURQ POLYPR HUB S STL THN WALL IM

## (undated) DEVICE — SYRINGE MED 10ML LUERLOCK TIP W/O SFTY DISP

## (undated) DEVICE — TUBING, SUCTION, 1/4" X 12', STRAIGHT: Brand: MEDLINE

## (undated) DEVICE — BANDAGE COMPR W4INXL12FT E DISP ESMARCH EVEN

## (undated) DEVICE — MEDICINE CUP, GRADUATED, STER: Brand: MEDLINE

## (undated) DEVICE — C-ARM: Brand: UNBRANDED

## (undated) DEVICE — MERCY HEALTH WEST TURNOVER: Brand: MEDLINE INDUSTRIES, INC.

## (undated) DEVICE — HYPODERMIC SAFETY NEEDLE: Brand: MAGELLAN

## (undated) DEVICE — SHEET,DRAPE,53X77,STERILE: Brand: MEDLINE

## (undated) DEVICE — BANDAGE COMPR W4INXL15FT BGE E SGL LAYERED CLP CLSR

## (undated) DEVICE — MASC TURNOVER KIT: Brand: MEDLINE INDUSTRIES, INC.

## (undated) DEVICE — SPONGE GZ W4XL4IN COT 12 PLY TYP VII WVN C FLD DSGN STERILE

## (undated) DEVICE — GLOVE ORTHO 8   MSG9480

## (undated) DEVICE — 3M™ STERI-DRAPE™ U-DRAPE 1015: Brand: STERI-DRAPE™

## (undated) DEVICE — PACK PROCEDURE SURG EXTREMITY MFFOP CUST

## (undated) DEVICE — ELECTRODE PT RET AD L9FT HI MOIST COND ADH HYDRGEL CORDED

## (undated) DEVICE — GOWN SIRUS NONREIN XL W/TWL: Brand: MEDLINE INDUSTRIES, INC.

## (undated) DEVICE — MINOR SET UP: Brand: MEDLINE INDUSTRIES, INC.

## (undated) DEVICE — APPLICATOR MEDICATED 26 CC SOLUTION HI LT ORNG CHLORAPREP

## (undated) DEVICE — ZIMMER® STERILE DISPOSABLE TOURNIQUET CUFF WITH PLC, DUAL PORT, SINGLE BLADDER, 34 IN. (86 CM)

## (undated) DEVICE — GLOVE SURG SZ 65 L12IN FNGR THK79MIL GRN LTX FREE

## (undated) DEVICE — GAUZE,SPONGE,4"X4",8PLY,STRL,LF,10/TRAY: Brand: MEDLINE

## (undated) DEVICE — SCREW BNE L16MM DIA3.5MM HD DIA2.7MM PERIARTC CORT S STL ST
Type: IMPLANTABLE DEVICE | Site: ANKLE | Status: NON-FUNCTIONAL
Removed: 2023-10-26